# Patient Record
Sex: FEMALE | Race: WHITE | NOT HISPANIC OR LATINO | URBAN - METROPOLITAN AREA
[De-identification: names, ages, dates, MRNs, and addresses within clinical notes are randomized per-mention and may not be internally consistent; named-entity substitution may affect disease eponyms.]

---

## 2018-10-24 ENCOUNTER — INPATIENT (INPATIENT)
Facility: HOSPITAL | Age: 48
LOS: 6 days | Discharge: HOME | DRG: 539 | End: 2018-10-31
Attending: INTERNAL MEDICINE | Admitting: HOSPITALIST
Payer: COMMERCIAL

## 2018-10-24 VITALS
HEART RATE: 112 BPM | RESPIRATION RATE: 19 BRPM | DIASTOLIC BLOOD PRESSURE: 90 MMHG | SYSTOLIC BLOOD PRESSURE: 148 MMHG | OXYGEN SATURATION: 98 % | TEMPERATURE: 99 F | WEIGHT: 132.06 LBS | HEIGHT: 63 IN

## 2018-10-24 DIAGNOSIS — Z30.2 ENCOUNTER FOR STERILIZATION: Chronic | ICD-10-CM

## 2018-10-24 DIAGNOSIS — M54.9 DORSALGIA, UNSPECIFIED: ICD-10-CM

## 2018-10-24 LAB
ALBUMIN SERPL ELPH-MCNC: 4 G/DL — SIGNIFICANT CHANGE UP (ref 3.3–5)
ALP SERPL-CCNC: 79 U/L — SIGNIFICANT CHANGE UP (ref 40–120)
ALT FLD-CCNC: 18 U/L — SIGNIFICANT CHANGE UP (ref 10–45)
ANION GAP SERPL CALC-SCNC: 14 MMOL/L — SIGNIFICANT CHANGE UP (ref 5–17)
APPEARANCE UR: CLEAR — SIGNIFICANT CHANGE UP
APTT BLD: 47.3 SEC — HIGH (ref 27.5–37.4)
AST SERPL-CCNC: 18 U/L — SIGNIFICANT CHANGE UP (ref 10–40)
BACTERIA # UR AUTO: NEGATIVE — SIGNIFICANT CHANGE UP
BASOPHILS # BLD AUTO: 0 K/UL — SIGNIFICANT CHANGE UP (ref 0–0.2)
BASOPHILS NFR BLD AUTO: 0.1 % — SIGNIFICANT CHANGE UP (ref 0–2)
BILIRUB SERPL-MCNC: 0.3 MG/DL — SIGNIFICANT CHANGE UP (ref 0.2–1.2)
BILIRUB UR-MCNC: NEGATIVE — SIGNIFICANT CHANGE UP
BUN SERPL-MCNC: 10 MG/DL — SIGNIFICANT CHANGE UP (ref 7–23)
CALCIUM SERPL-MCNC: 9.8 MG/DL — SIGNIFICANT CHANGE UP (ref 8.4–10.5)
CHLORIDE SERPL-SCNC: 101 MMOL/L — SIGNIFICANT CHANGE UP (ref 96–108)
CO2 SERPL-SCNC: 23 MMOL/L — SIGNIFICANT CHANGE UP (ref 22–31)
COLOR SPEC: SIGNIFICANT CHANGE UP
CREAT SERPL-MCNC: 0.52 MG/DL — SIGNIFICANT CHANGE UP (ref 0.5–1.3)
DIFF PNL FLD: ABNORMAL
EOSINOPHIL # BLD AUTO: 0 K/UL — SIGNIFICANT CHANGE UP (ref 0–0.5)
EOSINOPHIL NFR BLD AUTO: 0.4 % — SIGNIFICANT CHANGE UP (ref 0–6)
EPI CELLS # UR: 1 /HPF — SIGNIFICANT CHANGE UP
ERYTHROCYTE [SEDIMENTATION RATE] IN BLOOD: 58 MM/HR — HIGH (ref 0–15)
GAS PNL BLDV: SIGNIFICANT CHANGE UP
GLUCOSE SERPL-MCNC: 101 MG/DL — HIGH (ref 70–99)
GLUCOSE UR QL: NEGATIVE — SIGNIFICANT CHANGE UP
HCG SERPL-ACNC: <2 MIU/ML — SIGNIFICANT CHANGE UP
HCT VFR BLD CALC: 39 % — SIGNIFICANT CHANGE UP (ref 34.5–45)
HGB BLD-MCNC: 12.8 G/DL — SIGNIFICANT CHANGE UP (ref 11.5–15.5)
HYALINE CASTS # UR AUTO: 0 /LPF — SIGNIFICANT CHANGE UP (ref 0–2)
INR BLD: 1.3 RATIO — HIGH (ref 0.88–1.16)
KETONES UR-MCNC: ABNORMAL
LEUKOCYTE ESTERASE UR-ACNC: NEGATIVE — SIGNIFICANT CHANGE UP
LYMPHOCYTES # BLD AUTO: 1.6 K/UL — SIGNIFICANT CHANGE UP (ref 1–3.3)
LYMPHOCYTES # BLD AUTO: 19.1 % — SIGNIFICANT CHANGE UP (ref 13–44)
MCHC RBC-ENTMCNC: 28.5 PG — SIGNIFICANT CHANGE UP (ref 27–34)
MCHC RBC-ENTMCNC: 32.7 GM/DL — SIGNIFICANT CHANGE UP (ref 32–36)
MCV RBC AUTO: 87 FL — SIGNIFICANT CHANGE UP (ref 80–100)
MONOCYTES # BLD AUTO: 0.8 K/UL — SIGNIFICANT CHANGE UP (ref 0–0.9)
MONOCYTES NFR BLD AUTO: 9 % — SIGNIFICANT CHANGE UP (ref 2–14)
NEUTROPHILS # BLD AUTO: 6.2 K/UL — SIGNIFICANT CHANGE UP (ref 1.8–7.4)
NEUTROPHILS NFR BLD AUTO: 71.3 % — SIGNIFICANT CHANGE UP (ref 43–77)
NITRITE UR-MCNC: NEGATIVE — SIGNIFICANT CHANGE UP
PH UR: 6 — SIGNIFICANT CHANGE UP (ref 5–8)
PLATELET # BLD AUTO: 338 K/UL — SIGNIFICANT CHANGE UP (ref 150–400)
POTASSIUM SERPL-MCNC: 4.2 MMOL/L — SIGNIFICANT CHANGE UP (ref 3.5–5.3)
POTASSIUM SERPL-SCNC: 4.2 MMOL/L — SIGNIFICANT CHANGE UP (ref 3.5–5.3)
PROT SERPL-MCNC: 8.2 G/DL — SIGNIFICANT CHANGE UP (ref 6–8.3)
PROT UR-MCNC: NEGATIVE — SIGNIFICANT CHANGE UP
PROTHROM AB SERPL-ACNC: 14.2 SEC — HIGH (ref 9.8–12.7)
RAPID RVP RESULT: SIGNIFICANT CHANGE UP
RBC # BLD: 4.48 M/UL — SIGNIFICANT CHANGE UP (ref 3.8–5.2)
RBC # FLD: 13.3 % — SIGNIFICANT CHANGE UP (ref 10.3–14.5)
RBC CASTS # UR COMP ASSIST: 2 /HPF — SIGNIFICANT CHANGE UP (ref 0–4)
SODIUM SERPL-SCNC: 138 MMOL/L — SIGNIFICANT CHANGE UP (ref 135–145)
SP GR SPEC: 1.01 — SIGNIFICANT CHANGE UP (ref 1.01–1.02)
UROBILINOGEN FLD QL: NEGATIVE — SIGNIFICANT CHANGE UP
WBC # BLD: 8.6 K/UL — SIGNIFICANT CHANGE UP (ref 3.8–10.5)
WBC # FLD AUTO: 8.6 K/UL — SIGNIFICANT CHANGE UP (ref 3.8–10.5)
WBC UR QL: 1 /HPF — SIGNIFICANT CHANGE UP (ref 0–5)

## 2018-10-24 PROCEDURE — 99285 EMERGENCY DEPT VISIT HI MDM: CPT | Mod: 25

## 2018-10-24 PROCEDURE — 93010 ELECTROCARDIOGRAM REPORT: CPT | Mod: NC

## 2018-10-24 PROCEDURE — 71046 X-RAY EXAM CHEST 2 VIEWS: CPT | Mod: 26

## 2018-10-24 RX ORDER — PIPERACILLIN AND TAZOBACTAM 4; .5 G/20ML; G/20ML
3.38 INJECTION, POWDER, LYOPHILIZED, FOR SOLUTION INTRAVENOUS ONCE
Qty: 0 | Refills: 0 | Status: COMPLETED | OUTPATIENT
Start: 2018-10-24 | End: 2018-10-24

## 2018-10-24 RX ORDER — VANCOMYCIN HCL 1 G
1000 VIAL (EA) INTRAVENOUS ONCE
Qty: 0 | Refills: 0 | Status: COMPLETED | OUTPATIENT
Start: 2018-10-24 | End: 2018-10-24

## 2018-10-24 RX ADMIN — Medication 250 MILLIGRAM(S): at 23:45

## 2018-10-24 RX ADMIN — PIPERACILLIN AND TAZOBACTAM 200 GRAM(S): 4; .5 INJECTION, POWDER, LYOPHILIZED, FOR SOLUTION INTRAVENOUS at 22:09

## 2018-10-24 RX ADMIN — PIPERACILLIN AND TAZOBACTAM 3.38 GRAM(S): 4; .5 INJECTION, POWDER, LYOPHILIZED, FOR SOLUTION INTRAVENOUS at 23:54

## 2018-10-24 NOTE — CONSULT NOTE ADULT - SUBJECTIVE AND OBJECTIVE BOX
p ([id number])     HPI: 47 y/o female PMHX HTN, Ulcerative Colitis was directed by neurosurgeon Dr Usman Craig for further w/u as pt was found to have possible infected disc space L1-L2 vs multiple myeloma seen on outpatient PET CT scan. Patient was having lower back spasms for one month with radiation of pain to bilateral hips described to be an ache. Pain improved with lying down and worse with prolonged walking/standing in one position too long. Pt seen by pain management Dr. Jcarlos Robert who performed trigger point injections with relief of symptoms and recommended MR back which showed disc extrusion L1-L2. Results appeared to be inconclusive per patient thus prompted pain management to get PET scan. Patient stated she has had 15 pound intentional weight loss as pt is on weight watchers, has chills with subjective fevers, and post nasal drip cough. Patient denied fever chills, SOB, abdominal pain, N/V/D, headache, neurosensory defecits, night sweats, rash    PAST MEDICAL HISTORY   HTN (hypertension)  Depression  Ulcerative colitis    PAST SURGICAL HISTORY   Encounter for Essure implantation        MEDICATIONS:  Antibiotics:  piperacillin/tazobactam IVPB. 3.375 Gram(s) IV Intermittent once  vancomycin  IVPB 1000 milliGRAM(s) IV Intermittent once    Neuro:    Anticoagulation:    Other:      SOCIAL HISTORY:   Occupation:   Marital Status:     FAMILY HISTORY:      REVIEW OF SYSTEMS:  negative but for hpi  General:  Eyes:  ENT:  Cardiac:  Respiratory:  GI:  Musculoskeletal:   Skin:  Neurologic:   Psychiatric:     PHYSICAL EXAMINATION:   T(C): 37.2 (10-24-18 @ 18:59), Max: 37.2 (10-24-18 @ 18:06)  HR: 102 (10-24-18 @ 18:59) (102 - 112)  BP: 124/84 (10-24-18 @ 18:59) (124/84 - 148/90)  RR: 18 (10-24-18 @ 18:59) (18 - 19)  SpO2: 100% (10-24-18 @ 18:59) (98% - 100%)  Wt(kg): --Height (cm): 160.02 (10-24 @ 18:06)  Weight (kg): 59.9 (10-24 @ 18:06)    General Examination:     Neurologic Examination:           PHYSICAL EXAM:    Constitutional: No Acute Distress     Neurological: AOx3, Following Commands    Motor exam:          Upper extremity                         Delt     Bicep     Tricep    HG                                                 R         5/5 5/5 5/5 5/5                                               L          5/5 5/5 5/5 5/5          Lower extremity                        HF         KF        KE       DF         PF                                                  R        5/5 5/5 5/5 5/5 5/5                                               L         5/5 5/5 5/5 5/5 5/5                                                 Sensation: [x] intact to light touch  [] decreased:     No clonus, no hoffmans      LABS:                RADIOLOGY & ADDITIONAL STUDIES:

## 2018-10-24 NOTE — ED ADULT NURSE NOTE - CHPI ED NUR SYMPTOMS NEG
no anorexia/no numbness/no tingling/no bowel dysfunction/no difficulty bearing weight/no motor function loss/no bladder dysfunction

## 2018-10-24 NOTE — ED PROVIDER NOTE - ATTENDING CONTRIBUTION TO CARE
49 y/o F with h/o UC (on remicade), HTN, depression and recent chronic/subacute back pain (1month +), who was sent to ED for evaluation after outpatient PET scan showed evidence of possible myelitis vs multiple myeloma.  Had MRI previously with concerning findings, had pet ct performed and then went to neurosurgeon today, who evaluated patient and imaging and directed her to ED for further w/u.  Pain in low back is described as sharp/spasms, worse with walking or standing in one position too long.  No weakness in extremity reported.  No numbness or loss of sensation.  No loss of bowel or bladder control reported.    On Physical Exam:  General: well appearing, afebrile, in NAD, speaking clearly in full sentences and without difficulty; cooperative with exam  HEENT: PERRL, MMM  Neck: no neck tenderness, no nuchal rigidity  Cardiac: normal s1, s2; RRR; no MGR  Lungs: CTABL  Abdomen: soft nontender/nondistended  Back: mild tenderness to palpation along upper mid lumbar region.  No step/off, no overlying erythema/warmth/crepitance.  : no bladder tenderness or distension  Skin: intact, no rash  Extremities: no peripheral edema, no gross deformities  Neuro: no gross neurologic deficits.  No LE weakness: 5/5 str in hips/knees/ankles b/l.  No saddle anesthesia or other areas of sensory loss elicited.  Patellar and ankle reflexes present and equal bilaterally.    AP: Imaging and complaints concerning for indolent discitis vs underlying malignancy. Have consulted spine and will likely require admission for further evaluation and management.

## 2018-10-24 NOTE — ED ADULT NURSE NOTE - OBJECTIVE STATEMENT
48 year old female presents ambulatory with a steady gait to the ED  with a chief compliant of lower back pain x 1 month. Per PCP Rose Marie patient got a PET scan this morning for the lower back pain which showed either " a massive infection or multiple myeloma." Patient was sent here for work up. Back pain is a 2/10 midline lumbar spine radiating down to bilateral hips. Patient states she feels like "weight is sitting on my hips." Patient confirms she can no longer run or walk fast x 1 month.  No sensory or motor deficits. No numbness or tingling in upper and lower extremities. No  or GI symptoms. Patient confirms feeling chills and overheated but has not taken her temperature.  Patient denies nausea, vomiting, diarrhea, chest pain, or shortness of breath. PMH ulcerative colitis. Patient in no acute distress. 48 year old female presents ambulatory with a steady gait to the ED  with a chief compliant of lower back pain x 1 month. Per PCP Rose Marie patient got a PET scan this morning for the lower back pain which showed either " a massive infection or multiple myeloma." Patient was sent here for work up. Back pain is a 2/10 midline lumbar spine radiating down to bilateral hips. Patient states she feels like "weight is sitting on my hips." Patient confirms she can no longer run or walk fast x 1 month.  No sensory or motor deficits. No numbness or tingling in upper and lower extremities. No  or GI symptoms. Patient confirms feeling chills and overheated but has not taken her temperature.  Patient denies nausea, vomiting, diarrhea, chest pain, or shortness of breath. PMH HTN and ulcerative colitis. Patient in no acute distress and declines pain medication at this time. Will continue to monitor.

## 2018-10-24 NOTE — ED ADULT TRIAGE NOTE - CHIEF COMPLAINT QUOTE
c/o back pain rad to juwan hips x 1 month, had PET today: "massive infection vs multiple myeloma tumor on lumbar spine", taking tylenol at home for pain, "hard time controlling body temperature..freezing...very hot", ambulatory

## 2018-10-24 NOTE — ED PROVIDER NOTE - OBJECTIVE STATEMENT
49 y/o female PMHX HTN, Ulcerative Colitis was directed by neurosurgeon Dr. Dr Usman Craig for further w/u as pt was found to have possible infected disc space L1-L2 vs multiple myeloma seen on outpatient PET CT scan. Patient was having lower back spasms for one month with radiation of pain to bilateral hips described to be an ache. Pt seen by pain management Dr. Jcarlos Robert who performed trigger point injections with relief of symptoms and recommended MR back which showed disc extrusion L1-L2. results appeared to be inconclusive per patient 47 y/o female PMHX HTN, Ulcerative Colitis was directed by neurosurgeon Dr Usman Craig for further w/u as pt was found to have possible infected disc space L1-L2 vs multiple myeloma seen on outpatient PET CT scan. Patient was having lower back spasms for one month with radiation of pain to bilateral hips described to be an ache. Pain improved with lying down and worse with prolonged walking/standing in one position too long. Pt seen by pain management Dr. Jcarlos Robert who performed trigger point injections with relief of symptoms and recommended MR back which showed disc extrusion L1-L2. Results appeared to be inconclusive per patient thus prompted pain management to get PET scan. Patient stated she has had 15 pound intentional weight loss as pt is on weight watchers, has chills with subjective fevers, and post nasal drip cough. Patient denied fever chills, SOB, abdominal pain, N/V/D, headache, neurosensory defecits, night sweats, rash 47 y/o female PMHX HTN, Ulcerative Colitis on Remicaide was directed by neurosurgeon Dr Usman Craig for further w/u as pt was found to have possible infected disc space L1-L2 vs multiple myeloma seen on outpatient PET CT scan. Patient was having lower back spasms for one month with radiation of pain to bilateral hips described to be an ache. Pain improved with lying down and worse with prolonged walking/standing in one position too long. Pt seen by pain management Dr. Jcarlos Robert who performed trigger point injections with relief of symptoms and recommended MR back which showed disc extrusion L1-L2. Results appeared to be inconclusive per patient thus prompted pain management to get PET scan. Patient stated she has had 15 pound intentional weight loss as pt is on weight watchers, has chills with subjective fevers, and post nasal drip cough. Patient denied fever chills, SOB, abdominal pain, N/V/D, headache, neurosensory defecits, night sweats, rash 47 y/o female PMHX HTN, Ulcerative Colitis on Remicaide was directed by neurosurgeon Dr Usman Craig for further w/u as pt was found to have possible infected disc space L1-L2 vs multiple myeloma seen on outpatient PET CT scan. Patient was having lower back spasms for one month with radiation of pain to bilateral hips described to be an ache. Pain improved with lying down and worse with prolonged walking/standing in one position too long. Pt seen by pain management Dr. Jcarlos Robert who performed trigger point injections with relief of symptoms and recommended MR back which showed disc extrusion L1-L2. Results appeared to be inconclusive per patient thus prompted pain management to get PET scan. Patient stated she has had 15 pound intentional weight loss as pt is on weight watchers, has chills with subjective fevers, and post nasal drip cough. Patient denied fever chills, SOB, abdominal pain, N/V/D, headache, neurosensory defecits, night sweats, rash    PMD: Dr. Srinivasan Trotter

## 2018-10-24 NOTE — ED PROVIDER NOTE - PROGRESS NOTE DETAILS
Eliazar Orellana: Paged neurosurgery for spine eval PAU Orellana: Gave CD to charge RN to upload at medical records. ED tech will run images to medical records PAU Orellana: Made medical records [id number] aware will be sending CDs and confirmed with radiology tech Tao extension [phone] who will upload them PAU Orellana: Discussed case with neurosurgery who believed this case likely infectious and related to osteomyelitis. As pt is immunosuppressed secondary to remicade will give vanc zosyn to empirically treat

## 2018-10-25 DIAGNOSIS — M46.20 OSTEOMYELITIS OF VERTEBRA, SITE UNSPECIFIED: ICD-10-CM

## 2018-10-25 DIAGNOSIS — I10 ESSENTIAL (PRIMARY) HYPERTENSION: ICD-10-CM

## 2018-10-25 DIAGNOSIS — Q76.49 OTHER CONGENITAL MALFORMATIONS OF SPINE, NOT ASSOCIATED WITH SCOLIOSIS: ICD-10-CM

## 2018-10-25 DIAGNOSIS — R70.0 ELEVATED ERYTHROCYTE SEDIMENTATION RATE: ICD-10-CM

## 2018-10-25 DIAGNOSIS — F32.9 MAJOR DEPRESSIVE DISORDER, SINGLE EPISODE, UNSPECIFIED: ICD-10-CM

## 2018-10-25 DIAGNOSIS — K68.12 PSOAS MUSCLE ABSCESS: ICD-10-CM

## 2018-10-25 DIAGNOSIS — Z29.9 ENCOUNTER FOR PROPHYLACTIC MEASURES, UNSPECIFIED: ICD-10-CM

## 2018-10-25 DIAGNOSIS — R79.82 ELEVATED C-REACTIVE PROTEIN (CRP): ICD-10-CM

## 2018-10-25 DIAGNOSIS — M54.9 DORSALGIA, UNSPECIFIED: ICD-10-CM

## 2018-10-25 DIAGNOSIS — K51.90 ULCERATIVE COLITIS, UNSPECIFIED, WITHOUT COMPLICATIONS: ICD-10-CM

## 2018-10-25 LAB
% ALBUMIN: 45.9 % — SIGNIFICANT CHANGE UP
% ALPHA 1: 6.4 % — SIGNIFICANT CHANGE UP
% ALPHA 2: 11.7 % — SIGNIFICANT CHANGE UP
% BETA: 13.6 % — SIGNIFICANT CHANGE UP
% GAMMA: 22.4 % — SIGNIFICANT CHANGE UP
ALBUMIN SERPL ELPH-MCNC: 3.7 G/DL — SIGNIFICANT CHANGE UP (ref 3.6–5.5)
ALBUMIN/GLOB SERPL ELPH: 0.8 RATIO — SIGNIFICANT CHANGE UP
ALPHA1 GLOB SERPL ELPH-MCNC: 0.5 G/DL — HIGH (ref 0.1–0.4)
ALPHA2 GLOB SERPL ELPH-MCNC: 0.9 G/DL — SIGNIFICANT CHANGE UP (ref 0.5–1)
ANION GAP SERPL CALC-SCNC: 12 MMOL/L — SIGNIFICANT CHANGE UP (ref 5–17)
B-GLOBULIN SERPL ELPH-MCNC: 1.1 G/DL — HIGH (ref 0.5–1)
B2 MICROGLOB SERPL-MCNC: 2.3 MG/L — HIGH (ref 0.8–2.2)
BASOPHILS # BLD AUTO: 0.01 K/UL — SIGNIFICANT CHANGE UP (ref 0–0.2)
BASOPHILS NFR BLD AUTO: 0.1 % — SIGNIFICANT CHANGE UP (ref 0–2)
BUN SERPL-MCNC: 9 MG/DL — SIGNIFICANT CHANGE UP (ref 7–23)
CALCIUM SERPL-MCNC: 9.4 MG/DL — SIGNIFICANT CHANGE UP (ref 8.4–10.5)
CHLORIDE SERPL-SCNC: 103 MMOL/L — SIGNIFICANT CHANGE UP (ref 96–108)
CO2 SERPL-SCNC: 23 MMOL/L — SIGNIFICANT CHANGE UP (ref 22–31)
CREAT SERPL-MCNC: 0.54 MG/DL — SIGNIFICANT CHANGE UP (ref 0.5–1.3)
CRP SERPL-MCNC: 5.45 MG/DL — HIGH (ref 0–0.4)
CULTURE RESULTS: NO GROWTH — SIGNIFICANT CHANGE UP
EOSINOPHIL # BLD AUTO: 0.06 K/UL — SIGNIFICANT CHANGE UP (ref 0–0.5)
EOSINOPHIL NFR BLD AUTO: 0.8 % — SIGNIFICANT CHANGE UP (ref 0–6)
GAMMA GLOBULIN: 1.8 G/DL — HIGH (ref 0.6–1.6)
GLUCOSE SERPL-MCNC: 90 MG/DL — SIGNIFICANT CHANGE UP (ref 70–99)
HCT VFR BLD CALC: 35.9 % — SIGNIFICANT CHANGE UP (ref 34.5–45)
HGB BLD-MCNC: 11.6 G/DL — SIGNIFICANT CHANGE UP (ref 11.5–15.5)
IMM GRANULOCYTES NFR BLD AUTO: 0.3 % — SIGNIFICANT CHANGE UP (ref 0–1.5)
INTERPRETATION SERPL IFE-IMP: SIGNIFICANT CHANGE UP
LDH SERPL L TO P-CCNC: 166 U/L — SIGNIFICANT CHANGE UP (ref 50–242)
LYMPHOCYTES # BLD AUTO: 2.02 K/UL — SIGNIFICANT CHANGE UP (ref 1–3.3)
LYMPHOCYTES # BLD AUTO: 27 % — SIGNIFICANT CHANGE UP (ref 13–44)
MCHC RBC-ENTMCNC: 27.4 PG — SIGNIFICANT CHANGE UP (ref 27–34)
MCHC RBC-ENTMCNC: 32.3 GM/DL — SIGNIFICANT CHANGE UP (ref 32–36)
MCV RBC AUTO: 84.9 FL — SIGNIFICANT CHANGE UP (ref 80–100)
MONOCYTES # BLD AUTO: 0.98 K/UL — HIGH (ref 0–0.9)
MONOCYTES NFR BLD AUTO: 13.1 % — SIGNIFICANT CHANGE UP (ref 2–14)
NEUTROPHILS # BLD AUTO: 4.4 K/UL — SIGNIFICANT CHANGE UP (ref 1.8–7.4)
NEUTROPHILS NFR BLD AUTO: 58.7 % — SIGNIFICANT CHANGE UP (ref 43–77)
PLATELET # BLD AUTO: 344 K/UL — SIGNIFICANT CHANGE UP (ref 150–400)
POTASSIUM SERPL-MCNC: 4.2 MMOL/L — SIGNIFICANT CHANGE UP (ref 3.5–5.3)
POTASSIUM SERPL-SCNC: 4.2 MMOL/L — SIGNIFICANT CHANGE UP (ref 3.5–5.3)
PROT PATTERN SERPL ELPH-IMP: SIGNIFICANT CHANGE UP
PROT SERPL-MCNC: 8.1 G/DL — SIGNIFICANT CHANGE UP (ref 6–8.3)
RBC # BLD: 4.23 M/UL — SIGNIFICANT CHANGE UP (ref 3.8–5.2)
RBC # FLD: 14.3 % — SIGNIFICANT CHANGE UP (ref 10.3–14.5)
SODIUM SERPL-SCNC: 138 MMOL/L — SIGNIFICANT CHANGE UP (ref 135–145)
SPECIMEN SOURCE: SIGNIFICANT CHANGE UP
WBC # BLD: 7.49 K/UL — SIGNIFICANT CHANGE UP (ref 3.8–10.5)
WBC # FLD AUTO: 7.49 K/UL — SIGNIFICANT CHANGE UP (ref 3.8–10.5)

## 2018-10-25 PROCEDURE — 99223 1ST HOSP IP/OBS HIGH 75: CPT

## 2018-10-25 PROCEDURE — 12345: CPT | Mod: NC

## 2018-10-25 PROCEDURE — 99254 IP/OBS CNSLTJ NEW/EST MOD 60: CPT

## 2018-10-25 PROCEDURE — 72149 MRI LUMBAR SPINE W/DYE: CPT | Mod: 26

## 2018-10-25 RX ORDER — INFLUENZA VIRUS VACCINE 15; 15; 15; 15 UG/.5ML; UG/.5ML; UG/.5ML; UG/.5ML
0.5 SUSPENSION INTRAMUSCULAR ONCE
Qty: 0 | Refills: 0 | Status: DISCONTINUED | OUTPATIENT
Start: 2018-10-25 | End: 2018-10-31

## 2018-10-25 RX ORDER — INFLIXIMAB-DYYB 120 MG/ML
0 INJECTION SUBCUTANEOUS
Qty: 0 | Refills: 0 | COMMUNITY

## 2018-10-25 RX ORDER — ENOXAPARIN SODIUM 100 MG/ML
40 INJECTION SUBCUTANEOUS EVERY 24 HOURS
Qty: 0 | Refills: 0 | Status: COMPLETED | OUTPATIENT
Start: 2018-10-25 | End: 2018-10-28

## 2018-10-25 RX ORDER — LOSARTAN POTASSIUM 100 MG/1
25 TABLET, FILM COATED ORAL DAILY
Qty: 0 | Refills: 0 | Status: DISCONTINUED | OUTPATIENT
Start: 2018-10-25 | End: 2018-10-25

## 2018-10-25 RX ORDER — LEVOMILNACIPRAN HYDROCHLORIDE 40 MG/1
1 CAPSULE, EXTENDED RELEASE ORAL
Qty: 0 | Refills: 0 | COMMUNITY

## 2018-10-25 RX ORDER — DOCOSANOL 100 MG/G
1 CREAM TOPICAL
Qty: 0 | Refills: 0 | Status: DISCONTINUED | OUTPATIENT
Start: 2018-10-25 | End: 2018-10-31

## 2018-10-25 RX ORDER — LOSARTAN POTASSIUM 100 MG/1
0 TABLET, FILM COATED ORAL
Qty: 0 | Refills: 0 | COMMUNITY

## 2018-10-25 RX ORDER — PIPERACILLIN AND TAZOBACTAM 4; .5 G/20ML; G/20ML
3.38 INJECTION, POWDER, LYOPHILIZED, FOR SOLUTION INTRAVENOUS EVERY 8 HOURS
Qty: 0 | Refills: 0 | Status: DISCONTINUED | OUTPATIENT
Start: 2018-10-25 | End: 2018-10-25

## 2018-10-25 RX ORDER — VANCOMYCIN HCL 1 G
1000 VIAL (EA) INTRAVENOUS EVERY 12 HOURS
Qty: 0 | Refills: 0 | Status: DISCONTINUED | OUTPATIENT
Start: 2018-10-25 | End: 2018-10-25

## 2018-10-25 RX ORDER — LOSARTAN POTASSIUM 100 MG/1
50 TABLET, FILM COATED ORAL DAILY
Qty: 0 | Refills: 0 | Status: DISCONTINUED | OUTPATIENT
Start: 2018-10-25 | End: 2018-10-31

## 2018-10-25 RX ORDER — LOSARTAN POTASSIUM 100 MG/1
50 TABLET, FILM COATED ORAL
Qty: 0 | Refills: 0 | COMMUNITY

## 2018-10-25 RX ORDER — ACYCLOVIR 50 MG/G
1 OINTMENT TOPICAL
Qty: 0 | Refills: 0 | Status: DISCONTINUED | OUTPATIENT
Start: 2018-10-25 | End: 2018-10-25

## 2018-10-25 RX ADMIN — LOSARTAN POTASSIUM 50 MILLIGRAM(S): 100 TABLET, FILM COATED ORAL at 09:58

## 2018-10-25 RX ADMIN — DOCOSANOL 1 APPLICATION(S): 100 CREAM TOPICAL at 12:39

## 2018-10-25 RX ADMIN — DOCOSANOL 1 APPLICATION(S): 100 CREAM TOPICAL at 21:59

## 2018-10-25 RX ADMIN — DOCOSANOL 1 APPLICATION(S): 100 CREAM TOPICAL at 16:21

## 2018-10-25 RX ADMIN — ENOXAPARIN SODIUM 40 MILLIGRAM(S): 100 INJECTION SUBCUTANEOUS at 10:02

## 2018-10-25 NOTE — H&P ADULT - PROBLEM SELECTOR PLAN 3
Well controlled on Losartan, though pt unclear on dose.  - Start on losartan 25mg  - Verify dose in AM On Fetzima (SNRI).  - Not on formulary. Pt will have to bring in her own.

## 2018-10-25 NOTE — PROGRESS NOTE ADULT - SUBJECTIVE AND OBJECTIVE BOX
Mika Hedrick M.D. Pager Number [phone]    Patient is a 48y old  Female who presents with a chief complaint of Osteomyelitis vs MM (25 Oct 2018 05:29)      SUBJECTIVE / OVERNIGHT EVENTS:  Pt seen and examined at bedside. No acute events overnight. Pt denies symptoms of cp, sob, palpitations, N/V, abd pain. Pt does endorse mild lower back discomfort. She denies fevers, however, does endorse feeling hot.     ROS:  All other review of systems negative    Allergies    No Known Allergies    Intolerances        MEDICATIONS  (STANDING):  docosanol 10% Cream 1 Application(s) Topical five times a day  enoxaparin Injectable 40 milliGRAM(s) SubCutaneous every 24 hours  influenza   Vaccine 0.5 milliLiter(s) IntraMuscular once  Levomilnacipran (Fetzima) 40 milliGRAM(s) 40 milliGRAM(s) Oral daily  losartan 50 milliGRAM(s) Oral daily    MEDICATIONS  (PRN):      Vital Signs Last 24 Hrs  T(C): 36.8 (25 Oct 2018 05:27), Max: 37.3 (24 Oct 2018 23:00)  T(F): 98.2 (25 Oct 2018 05:27), Max: 99.1 (24 Oct 2018 23:00)  HR: 79 (25 Oct 2018 09:56) (79 - 112)  BP: 108/75 (25 Oct 2018 09:56) (108/75 - 148/90)  BP(mean): --  RR: 16 (25 Oct 2018 05:27) (16 - 19)  SpO2: 98% (25 Oct 2018 05:27) (98% - 100%)  CAPILLARY BLOOD GLUCOSE        I&O's Summary    25 Oct 2018 07:01  -  25 Oct 2018 12:24  --------------------------------------------------------  IN: 120 mL / OUT: 200 mL / NET: -80 mL        PHYSICAL EXAM:  GENERAL: NAD, well-developed  HEAD:  Atraumatic, Normocephalic  EYES: EOMI, PERRLA, conjunctiva and sclera clear  NECK: Supple, No JVD  CHEST/LUNG: Clear to auscultation bilaterally; No wheeze  HEART: Regular rate and rhythm; No murmurs, rubs, or gallops  ABDOMEN: Soft, Nontender, Nondistended; Bowel sounds present  EXTREMITIES:  2+ Peripheral Pulses, No clubbing, cyanosis, or edema  NEUROLOGY: AAOx3, non-focal  PSYCH: calm  SKIN: No rashes or lesions    LABS:                        11.6   7.49  )-----------( 344      ( 25 Oct 2018 07:45 )             35.9     10-25    138  |  103  |  9   ----------------------------<  90  4.2   |  23  |  0.54    Ca    9.4      25 Oct 2018 05:47    TPro  8.2  /  Alb  4.0  /  TBili  0.3  /  DBili  x   /  AST  18  /  ALT  18  /  AlkPhos  79  10-    PT/INR - ( 24 Oct 2018 21:01 )   PT: 14.2 sec;   INR: 1.30 ratio         PTT - ( 24 Oct 2018 21:01 )  PTT:47.3 sec      Urinalysis Basic - ( 24 Oct 2018 21:10 )    Color: Light Yellow / Appearance: Clear / S.010 / pH: x  Gluc: x / Ketone: Small  / Bili: Negative / Urobili: Negative   Blood: x / Protein: Negative / Nitrite: Negative   Leuk Esterase: Negative / RBC: 2 /hpf / WBC 1 /hpf   Sq Epi: x / Non Sq Epi: 1 /hpf / Bacteria: Negative        RADIOLOGY & ADDITIONAL TESTS:    Imaging Personally Reviewed:    Consultant(s) Notes Reviewed:      Care Discussed with Consultants/Other Providers:    Case Discussed with Family:    Goals of Care:

## 2018-10-25 NOTE — PROGRESS NOTE ADULT - SUBJECTIVE AND OBJECTIVE BOX
Patient seen and examined at bedside.    T(C): 36.8 (10-25-18 @ 05:27), Max: 37.3 (10-24-18 @ 23:00)  HR: 79 (10-25-18 @ 05:27) (79 - 112)  BP: 114/75 (10-25-18 @ 05:27) (111/75 - 148/90)  RR: 16 (10-25-18 @ 05:27) (16 - 19)  SpO2: 98% (10-25-18 @ 05:27) (98% - 100%)  Wt(kg): --    Exam:    Constitutional: No Acute Distress     Neurological: AOx3, Following Commands    Motor exam:          Upper extremity                         Delt     Bicep     Tricep    HG                                                 R         5/5        5/5        5/5       5/5                                               L          5/5        5/5        5/5       5/5          Lower extremity                        HF         KF        KE       DF         PF                                                  R        5/5        5/5        5/5       5/5         5/5                                               L         5/5        5/5       5/5       5/5          5/5                                                 Sensation: [x] intact to light touch  [] decreased:

## 2018-10-25 NOTE — H&P ADULT - NSHPPHYSICALEXAM_GEN_ALL_CORE
Constitutional: Middle aged woman seen lying in bed in NAD  Eyes: PERRL  ENMT: Clear oropharynx  Neck: Supple  Respiratory: CTAB, no rhonchi, rales or wheezes  Cardiovascular: RRR, no m/g/r  Gastrointestinal: +BS, soft, NT/ND  Neurological: AAOx3, 5/5 strength grossly in b/l UE and LE  Psychiatric: Appropriate affect and mood  Extremities: No LE edema Vital Signs Last 24 Hrs  T(C): 37.3 (24 Oct 2018 23:00), Max: 37.3 (24 Oct 2018 23:00)  T(F): 99.1 (24 Oct 2018 23:00), Max: 99.1 (24 Oct 2018 23:00)  HR: 86 (24 Oct 2018 23:00) (86 - 112)  BP: 111/75 (24 Oct 2018 23:00) (111/75 - 148/90)  RR: 16 (24 Oct 2018 23:00) (16 - 19)  SpO2: 100% (24 Oct 2018 23:00) (98% - 100%)    Constitutional: Middle aged woman seen lying in bed in NAD  Eyes: PERRL  ENMT: Clear oropharynx  Neck: Supple  Respiratory: CTAB, no rhonchi, rales or wheezes  Cardiovascular: RRR, no m/g/r  Gastrointestinal: +BS, soft, NT/ND  Neurological: AAOx3, 5/5 strength grossly in b/l UE and LE  Psychiatric: Appropriate affect and mood  Extremities: No LE edema

## 2018-10-25 NOTE — H&P ADULT - ASSESSMENT
47 yo F with PMH HTN, UC (on Remicaide), depression sent in from neurosurgeon's office (Dr. Usman Craig) for further evaluation of PET scan findings concerning for osteomyelitis vs. MM. 49 yo F with PMH HTN, UC (on Remicaide), depression sent in from neurosurgeon's office (Dr. Usman Craig) for further evaluation of spinal lesion on PET scan, findings concerning for osteomyelitis vs. MM.

## 2018-10-25 NOTE — CONSULT NOTE ADULT - PROBLEM SELECTOR RECOMMENDATION 9
-possibly related to recent injections   -staphylococcus or streptococcus most likely  -may need neuroradiology for biopsy if blood cx negative  -hold on abx  -not septic  -check blood cx x 2 to r/o hematogenous spread  -neurosurgery input noted  -otherwise nonlocalizing for an alternate infection at this time

## 2018-10-25 NOTE — CONSULT NOTE ADULT - ATTENDING COMMENTS
Pt seen and examined today. Pt has T-L spine tenderness.    MRI L-spine +/- Gd obtained today reviewed and compared with prior MRI L-spine with Dr. Yisel Roberto. There has been progression of endplate changes at the L1-2 disc space. There is an enhancing paraspinal mass at this level.    CRP 5.45, ESR 58. BCx pending. Pt received single doses of Vanco and Piperacillin in ER, now not on Abx.    Recommend CT guided biopsy of L1-2 paraspinal enhancing mass with Anesthesia on 10/29/18. Hold off on Abx at present.
Steven Campos  Attending Physician   Division of Infectious Disease  Pager #[phone]  After 5pm/weekend or no response, call #[phone]

## 2018-10-25 NOTE — H&P ADULT - PROBLEM SELECTOR PLAN 1
Pt with back pain and PET scan findings concerning for osteomyelitis of L1-L2 vs. MM. Neurosurgery indicating no acute intervention overnight. ESR elevated but pt afebrile without white count on presentation. Pt endorsing mild anemia outpatient but Hgb wnl on presentation.  - Broad spectrum abx: vanc and zosyn  - F/u BCxs  - F/u neurosurgery recs  - Send SPEP, UPEP Pt with back pain and PET scan findings concerning for osteomyelitis of L1-L2 vs. MM. Neurosurgery indicating no acute intervention overnight. ESR elevated but pt afebrile without white count on presentation. Pt endorsing mild anemia outpatient but Hgb wnl on presentation.  - Will hold off on continuing abx until neurosurgery gives final recommendations as pt appears non-toxic at this time, however, low threshold to put her on broad spectrum.  - F/u BCxs  - F/u neurosurgery recs  - Send SPEP, UPEP, immunofixation, light chains --spinal lesion concerning for osteomyelitis vs malignancy such as multiple myeloma. Infectious process seems more likely as she has no other signs or symptoms of MM.  Pt with back pain and PET scan findings concerning for osteomyelitis of L1-L2 vs. MM. Neurosurgery indicating no acute intervention overnight. ESR elevated but pt afebrile without white count on presentation. Pt endorsing mild anemia outpatient but Hgb wnl on presentation.  - Discussed case with neurosurgery: hold off on additional abx in case of possible need for biopsy to guide treatment. Neurosurgery will review radiography early this morning once uploaded. Patient already received one dose each of vancomycin and zosyn. Currently non-toxic appearing, afebrile, no leukocytosis; however, low threshold to restart antibiotics if her condition changes at all.  - F/u BCxs  - F/u neurosurgery recs  - Send SPEP, UPEP, immunofixation, light chains

## 2018-10-25 NOTE — H&P ADULT - PROBLEM SELECTOR PLAN 4
DVT ppx: lovenox subq Well controlled on Losartan, though pt unclear on dose.  - Start on losartan 25mg  - Verify dose in AM Well controlled on Losartan  - C/w losartan 50mg

## 2018-10-25 NOTE — H&P ADULT - HISTORY OF PRESENT ILLNESS
49 yo F with PMH HTN, UC (on Remicaide), depression sent in from neurosurgeon's office (Dr. Usman Craig) for further evaluation of PET scan findings concerning for osteomyelitis vs. MM. Pt endorsed that in July, she fell and strained her back and had pain as a result. The pain improved but then, about 1 month ago, she strained it again trying to open a window and her pain returned. She endorsed muscle spasms that felt better when walking, worse when sitting still. She was sent to a pain management doctor who gave her trigger point injections which resolved the spasms. Since then, she has only had a pulling feeling in her mid/lower spine and hips that is uncomfortable but not painful. She says she is unable to bend over because of stiffness but that the pain is pretty much gone. She was sent for an MRI on 10/11 for further evaluation but the MRI was inconclusive. She then went for a PET scan this morning which showed hypermetabolism at L1, L2, concerning for infection vs. multiple myeloma. She was then referred to a neurosurgeon who recommended she present to the ED. Of note, she has been experiencing chills and hot flashes over the past 2 weeks but has not checked her temperature during this time.     ED:  VS - Tm 98.9, , /90, RR 19, SpO2 98% on RA  Pt given Zosyn and vanc x1 and neurosurgery consulted. 49 yo F with PMH HTN, UC (on Remicade), depression sent in from neurosurgeon's office (Dr. Usman Craig) for further evaluation of PET scan findings concerning for osteomyelitis vs. MM. Pt endorsed that in July, she fell and strained her back and had pain as a result. The pain improved but then, about 1 month ago, she strained it again trying to open a window and her pain returned. She endorsed muscle spasms that felt better when walking, worse when sitting still. She was sent to a pain management doctor who gave her trigger point injections which resolved the spasms. Since then, she has only had a pulling feeling in her mid/lower spine and hips that is uncomfortable but not painful. She says she is unable to bend over because of stiffness but that the pain is pretty much gone. She was sent for an MRI on 10/11 for further evaluation but the MRI was inconclusive. She then went for a PET scan this morning which showed hypermetabolism at L1, L2, concerning for infection vs. multiple myeloma. She was then referred to a neurosurgeon who recommended she present to the ED. Of note, she has been experiencing chills and hot flashes over the past 2 weeks but has not checked her temperature during this time.     ED:  VS - Tm 98.9, , /90, RR 19, SpO2 98% on RA  Pt given Zosyn and vanc x1 and neurosurgery consulted.

## 2018-10-25 NOTE — CONSULT NOTE ADULT - SUBJECTIVE AND OBJECTIVE BOX
ANANYA TOVAR 48y Female  MRN-[id number]    Patient is a 48y old  Female who presents with a chief complaint of Osteomyelitis vs MM (25 Oct 2018 12:24)      HPI:  49 yo F with PMH HTN, UC (on Remicade), depression sent in from neurosurgeon's office (Dr. Usman Craig) for further evaluation of PET scan findings concerning for osteomyelitis vs. MM. Pt endorsed that in July, she fell and strained her back and had pain as a result. The pain improved but then, about 1 month ago, she strained it again trying to open a window and her pain returned. She endorsed muscle spasms that felt better when walking, worse when sitting still. She was sent to a pain management doctor who gave her trigger point injections which resolved the spasms. Since then, she has only had a pulling feeling in her mid/lower spine and hips that is uncomfortable but not painful. She says she is unable to bend over because of stiffness but that the pain is pretty much gone. She was sent for an MRI on 10/11 for further evaluation but the MRI was inconclusive. She then went for a PET scan this morning which showed hypermetabolism at L1, L2, concerning for infection vs. multiple myeloma. She was then referred to a neurosurgeon who recommended she present to the ED. Of note, she has been experiencing chills and hot flashes over the past 2 weeks but has not checked her temperature during this time.     Pt on Remicade for UC    ED:  VS - Tm 98.9, , /90, RR 19, SpO2 98% on RA  Pt given Zosyn and vanc x1 and neurosurgery consulted. (25 Oct 2018 01:11)      PAST MEDICAL & SURGICAL HISTORY:  HTN (hypertension)  Depression  Ulcerative colitis  Encounter for Essure implantation      Allergies    No Known Allergies    Intolerances        ANTIMICROBIALS:  none    MEDICATIONS  (STANDING):  docosanol 10% Cream 1 Application(s) Topical five times a day  enoxaparin Injectable 40 milliGRAM(s) SubCutaneous every 24 hours  influenza   Vaccine 0.5 milliLiter(s) IntraMuscular once  Levomilnacipran (Fetzima) 40 milliGRAM(s) 40 milliGRAM(s) Oral daily  losartan 50 milliGRAM(s) Oral daily      Social History  Smoking: no  Etoh: no  Drug use: no  Born in [place]    Works at [employer]      FAMILY HISTORY:  Family history of COPD (chronic obstructive pulmonary disease) (Father)      Vital Signs Last 24 Hrs  T(C): 36.7 (25 Oct 2018 12:24), Max: 37.3 (24 Oct 2018 23:00)  T(F): 98.1 (25 Oct 2018 12:24), Max: 99.1 (24 Oct 2018 23:00)  HR: 88 (25 Oct 2018 12:24) (79 - 112)  BP: 109/74 (25 Oct 2018 12:24) (108/75 - 148/90)  BP(mean): --  RR: 18 (25 Oct 2018 12:24) (16 - 19)  SpO2: 100% (25 Oct 2018 12:24) (98% - 100%)    CBC Full  -  ( 25 Oct 2018 07:45 )  WBC Count : 7.49 K/uL  Hemoglobin : 11.6 g/dL  Hematocrit : 35.9 %  Platelet Count - Automated : 344 K/uL  Mean Cell Volume : 84.9 fl  Mean Cell Hemoglobin : 27.4 pg  Mean Cell Hemoglobin Concentration : 32.3 gm/dL  Auto Neutrophil # : 4.40 K/uL  Auto Lymphocyte # : 2.02 K/uL  Auto Monocyte # : 0.98 K/uL  Auto Eosinophil # : 0.06 K/uL  Auto Basophil # : 0.01 K/uL  Auto Neutrophil % : 58.7 %  Auto Lymphocyte % : 27.0 %  Auto Monocyte % : 13.1 %  Auto Eosinophil % : 0.8 %  Auto Basophil % : 0.1 %    10-25    138  |  103  |  9   ----------------------------<  90  4.2   |  23  |  0.54    Ca    9.4      25 Oct 2018 05:47    TPro  8.2  /  Alb  4.0  /  TBili  0.3  /  DBili  x   /  AST  18  /  ALT  18  /  AlkPhos  79  10-24    LIVER FUNCTIONS - ( 24 Oct 2018 21:01 )  Alb: 4.0 g/dL / Pro: 8.2 g/dL / ALK PHOS: 79 U/L / ALT: 18 U/L / AST: 18 U/L / GGT: x           Urinalysis Basic - ( 24 Oct 2018 21:10 )    Color: Light Yellow / Appearance: Clear / S.010 / pH: x  Gluc: x / Ketone: Small  / Bili: Negative / Urobili: Negative   Blood: x / Protein: Negative / Nitrite: Negative   Leuk Esterase: Negative / RBC: 2 /hpf / WBC 1 /hpf   Sq Epi: x / Non Sq Epi: 1 /hpf / Bacteria: Negative        MICROBIOLOGY:      Rapid RVP Result: NotDetec (10-24 @ 21:10)    RADIOLOGY  < from: MR Lumbar Spine w/ IV Cont (10.25.18 @ 09:13) >  Redemonstration of edema within the L1 and L2 vertebral bodies. New edema   within the L1-L2 intervertebral disc. New small erosions involving the   inferior endplate of L1 and superior endplate of L2. Abnormal enhancement   within the L1 and L2 vertebral bodies and endplates adjoining the L1-L2   disc space. Increased abnormal prevertebral and paravertebral soft tissue   at the L1-L2 level. Probable microabscesses in the psoas muscles.    Findings consistent with progressive discitis/osteomyelitis at L1-L2,   with prevertebral phlegmon and probable microabscesses in the psoas   muscles.    No spinal canal stenosis or neural foraminal narrowing.

## 2018-10-25 NOTE — H&P ADULT - NSHPREVIEWOFSYSTEMS_GEN_ALL_CORE
General: Denies dizziness, fatigue  Eyes: +blurry vision  ENMT: Denies rhinorrhea  Respiratory: Denies cough, SOB  Cardiovascular: Denies palpitations, CP  Gastrointestinal: Denies abd pain, N/V/D/C, hematochezia, melena  : Denies dysuria, increased freq  Musculoskeletal: +back pain; denies edema, joint pain  Endocrine: Denies increased thirst, increased frequency  Allergic/Immunologic: Denies rashes or hives General: Denies dizziness, fatigue  Eyes: +blurry vision  ENMT: Denies rhinorrhea  Respiratory: Denies cough, SOB  Cardiovascular: Denies palpitations, CP  Gastrointestinal: Denies abd pain, N/V/D/C, hematochezia, melena  : Denies dysuria, increased freq  Musculoskeletal: +back pain; denies edema, joint pain  Endocrine: Denies increased thirst, increased frequency  Allergic/Immunologic: Denies rashes or hives  Heme: no bleeding, petechiae  Psych: no anxiety, depression

## 2018-10-25 NOTE — H&P ADULT - NSHPLABSRESULTS_GEN_ALL_CORE
CBC Full  -  ( 24 Oct 2018 21:01 )  WBC Count : 8.6 K/uL  Hemoglobin : 12.8 g/dL  Hematocrit : 39.0 %  Platelet Count - Automated : 338 K/uL  Mean Cell Volume : 87.0 fl  Mean Cell Hemoglobin : 28.5 pg  Mean Cell Hemoglobin Concentration : 32.7 gm/dL  Auto Neutrophil # : 6.2 K/uL  Auto Lymphocyte # : 1.6 K/uL  Auto Monocyte # : 0.8 K/uL  Auto Eosinophil # : 0.0 K/uL  Auto Basophil # : 0.0 K/uL  Auto Neutrophil % : 71.3 %  Auto Lymphocyte % : 19.1 %  Auto Monocyte % : 9.0 %  Auto Eosinophil % : 0.4 %  Auto Basophil % : 0.1 %    10    138  |  101  |  10  ----------------------------<  101<H>  4.2   |  23  |  0.52    Ca    9.8      24 Oct 2018 21:01    TPro  8.2  /  Alb  4.0  /  TBili  0.3  /  DBili  x   /  AST  18  /  ALT  18  /  AlkPhos  79  10-    PT/INR - ( 24 Oct 2018 21:01 )   PT: 14.2 sec;   INR: 1.30 ratio    PTT - ( 24 Oct 2018 21:01 )  PTT:47.3 sec    Sedimentation Rate, Erythrocyte: 58 mm/hr (10.24.18 @ 21:01)    Blood Gas Venous - Lactate: 1.6 mmoL/L (10.24.18 @ 21:01)    Urinalysis Basic - ( 24 Oct 2018 21:10 )    Color: Light Yellow / Appearance: Clear / S.010 / pH: x  Gluc: x / Ketone: Small  / Bili: Negative / Urobili: Negative   Blood: x / Protein: Negative / Nitrite: Negative   Leuk Esterase: Negative / RBC: 2 /hpf / WBC 1 /hpf   Sq Epi: x / Non Sq Epi: 1 /hpf / Bacteria: Negative    < end of copied text >    Rapid RVP Result: NotDetec: The Film[employer] RVP Rapid uses polymerase chain reaction (PCR) and melt  curve analysis to screen for adenovirus; coronavirus HKU1, NL63, 229E,  OC43; human metapneumovirus (hMPV); human enterovirus/rhinovirus  (Entero/RV); influenza A; influenza A/H1;influenza A/H3; influenza  A/H1-2009; influenza B; parainfluenza viruses 1, 2, 3, 4; respiratory  syncytial virus; Bordetella pertussis; Mycoplasma pneumoniae; and  Chlamydophila pneumoniae. (10.24.18 @ 21:10)      < from: Xray Chest 2 Views PA/Lat (10.24.18 @ 20:30) >    PROCEDURE DATE:  10/24/2018      ******PRELIMINARY REPORT******    ******PRELIMINARY REPORT******            INTERPRETATION:  [facility] EKG, labs, and imaging personally reviewed and interpreted.     EKG: reviewed     CBC Full  -  ( 24 Oct 2018 21:01 )  WBC Count : 8.6 K/uL  Hemoglobin : 12.8 g/dL  Hematocrit : 39.0 %  Platelet Count - Automated : 338 K/uL  Mean Cell Volume : 87.0 fl  Mean Cell Hemoglobin : 28.5 pg  Mean Cell Hemoglobin Concentration : 32.7 gm/dL  Auto Neutrophil # : 6.2 K/uL  Auto Lymphocyte # : 1.6 K/uL  Auto Monocyte # : 0.8 K/uL  Auto Eosinophil # : 0.0 K/uL  Auto Basophil # : 0.0 K/uL  Auto Neutrophil % : 71.3 %  Auto Lymphocyte % : 19.1 %  Auto Monocyte % : 9.0 %  Auto Eosinophil % : 0.4 %  Auto Basophil % : 0.1 %    10    138  |  101  |  10  ----------------------------<  101<H>  4.2   |  23  |  0.52    Ca    9.8      24 Oct 2018 21:01    TPro  8.2  /  Alb  4.0  /  TBili  0.3  /  DBili  x   /  AST  18  /  ALT  18  /  AlkPhos  79  10-24    PT/INR - ( 24 Oct 2018 21:01 )   PT: 14.2 sec;   INR: 1.30 ratio    PTT - ( 24 Oct 2018 21:01 )  PTT:47.3 sec    Sedimentation Rate, Erythrocyte: 58 mm/hr (10.24.18 @ 21:01)    Blood Gas Venous - Lactate: 1.6 mmoL/L (10.24.18 @ 21:01)    Urinalysis Basic - ( 24 Oct 2018 21:10 )    Color: Light Yellow / Appearance: Clear / S.010 / pH: x  Gluc: x / Ketone: Small  / Bili: Negative / Urobili: Negative   Blood: x / Protein: Negative / Nitrite: Negative   Leuk Esterase: Negative / RBC: 2 /hpf / WBC 1 /hpf   Sq Epi: x / Non Sq Epi: 1 /hpf / Bacteria: Negative    < end of copied text >    Rapid RVP Result: NotDetec: The Film[employer] RVP Rapid uses polymerase chain reaction (PCR) and melt  curve analysis to screen for adenovirus; coronavirus HKU1, NL63, 229E,  OC43; human metapneumovirus (hMPV); human enterovirus/rhinovirus  (Entero/RV); influenza A; influenza A/H1;influenza A/H3; influenza  A/H1-2009; influenza B; parainfluenza viruses 1, 2, 3, 4; respiratory  syncytial virus; Bordetella pertussis; Mycoplasma pneumoniae; and  Chlamydophila pneumoniae. (10.24.18 @ 21:10)    < from: Xray Chest 2 Views PA/Lat (10.24.18 @ 20:30) >  PROCEDURE DATE:  10/24/2018    ******PRELIMINARY REPORT******        INTERPRETATION:  [facility]    Consult notes review: Yes, neurosurgery  Care discussed with other providers: Yes, neurosurgery

## 2018-10-25 NOTE — CONSULT NOTE ADULT - ASSESSMENT
48F here with back pain and abn finidngs on PET and MRI with L1/2 uptake concernin for OM vs MM  - no acute neurosurgical intervention  - F/u images once uploaded, unable to access as they were sent to be uploaded  - [facility] eval for OM vs MM  - Blood cx x2, esr, crp, CBC, BMP, SPEP, UPEP, M protein, free light chains  - pain control
47 yo F with PMH HTN, UC (on Remicade), depression sent in from neurosurgeon's office (Dr. Usman Craig) for further evaluation of PET scan findings concerning for spinal osteomyelitis, psoas abscess, back pain with elevated ESR, CRP

## 2018-10-25 NOTE — PROGRESS NOTE ADULT - PROBLEM SELECTOR PLAN 1
-spinal lesion concerning for osteomyelitis vs malignancy such as multiple myeloma. -Asymptomatic as well as hemodynamically stable. Physical exam unremarkable.  -Neurosurgery consult appreciated; No acute intervention.  -Follow up with MRI spine  -Follow up with BCxs  -Follow up with SPEP, UPEP, immunofixation, light chains

## 2018-10-26 DIAGNOSIS — Z79.2 LONG TERM (CURRENT) USE OF ANTIBIOTICS: ICD-10-CM

## 2018-10-26 DIAGNOSIS — Z51.81 ENCOUNTER FOR THERAPEUTIC DRUG LEVEL MONITORING: ICD-10-CM

## 2018-10-26 DIAGNOSIS — M86.18 OTHER ACUTE OSTEOMYELITIS, OTHER SITE: ICD-10-CM

## 2018-10-26 LAB
ANION GAP SERPL CALC-SCNC: 10 MMOL/L — SIGNIFICANT CHANGE UP (ref 5–17)
BUN SERPL-MCNC: 11 MG/DL — SIGNIFICANT CHANGE UP (ref 7–23)
CALCIUM SERPL-MCNC: 9.7 MG/DL — SIGNIFICANT CHANGE UP (ref 8.4–10.5)
CHLORIDE SERPL-SCNC: 101 MMOL/L — SIGNIFICANT CHANGE UP (ref 96–108)
CO2 SERPL-SCNC: 26 MMOL/L — SIGNIFICANT CHANGE UP (ref 22–31)
CREAT SERPL-MCNC: 0.56 MG/DL — SIGNIFICANT CHANGE UP (ref 0.5–1.3)
CREATININE, URINE RESULT: 83 MG/DL — SIGNIFICANT CHANGE UP
GLUCOSE SERPL-MCNC: 102 MG/DL — HIGH (ref 70–99)
HCT VFR BLD CALC: 37.9 % — SIGNIFICANT CHANGE UP (ref 34.5–45)
HGB BLD-MCNC: 12.6 G/DL — SIGNIFICANT CHANGE UP (ref 11.5–15.5)
KAPPA LC SER QL IFE: 2.3 MG/DL — HIGH (ref 0.33–1.94)
KAPPA/LAMBDA FREE LIGHT CHAIN RATIO, SERUM: 0.97 RATIO — SIGNIFICANT CHANGE UP (ref 0.26–1.65)
LAMBDA LC SER QL IFE: 2.38 MG/DL — SIGNIFICANT CHANGE UP (ref 0.57–2.63)
MCHC RBC-ENTMCNC: 29.3 PG — SIGNIFICANT CHANGE UP (ref 27–34)
MCHC RBC-ENTMCNC: 33.3 GM/DL — SIGNIFICANT CHANGE UP (ref 32–36)
MCV RBC AUTO: 87.8 FL — SIGNIFICANT CHANGE UP (ref 80–100)
PLATELET # BLD AUTO: 316 K/UL — SIGNIFICANT CHANGE UP (ref 150–400)
POTASSIUM SERPL-MCNC: 5 MMOL/L — SIGNIFICANT CHANGE UP (ref 3.5–5.3)
POTASSIUM SERPL-SCNC: 5 MMOL/L — SIGNIFICANT CHANGE UP (ref 3.5–5.3)
PROT ?TM UR-MCNC: 13 MG/DL — HIGH (ref 0–12)
PROT SERPL-MCNC: 7.7 G/DL — SIGNIFICANT CHANGE UP (ref 6–8.3)
RBC # BLD: 4.32 M/UL — SIGNIFICANT CHANGE UP (ref 3.8–5.2)
RBC # FLD: 12.7 % — SIGNIFICANT CHANGE UP (ref 10.3–14.5)
SODIUM SERPL-SCNC: 137 MMOL/L — SIGNIFICANT CHANGE UP (ref 135–145)
WBC # BLD: 7 K/UL — SIGNIFICANT CHANGE UP (ref 3.8–10.5)
WBC # FLD AUTO: 7 K/UL — SIGNIFICANT CHANGE UP (ref 3.8–10.5)

## 2018-10-26 PROCEDURE — 99233 SBSQ HOSP IP/OBS HIGH 50: CPT

## 2018-10-26 PROCEDURE — 99232 SBSQ HOSP IP/OBS MODERATE 35: CPT

## 2018-10-26 RX ADMIN — DOCOSANOL 1 APPLICATION(S): 100 CREAM TOPICAL at 08:29

## 2018-10-26 RX ADMIN — LOSARTAN POTASSIUM 50 MILLIGRAM(S): 100 TABLET, FILM COATED ORAL at 06:28

## 2018-10-26 RX ADMIN — DOCOSANOL 1 APPLICATION(S): 100 CREAM TOPICAL at 14:25

## 2018-10-26 RX ADMIN — ENOXAPARIN SODIUM 40 MILLIGRAM(S): 100 INJECTION SUBCUTANEOUS at 14:25

## 2018-10-26 NOTE — PROGRESS NOTE ADULT - SUBJECTIVE AND OBJECTIVE BOX
Patient seen and examined at bedside.    T(C): 36.9 (10-26-18 @ 04:50), Max: 37 (10-25-18 @ 17:52)  HR: 82 (10-26-18 @ 04:50) (79 - 92)  BP: 95/64 (10-26-18 @ 04:50) (95/64 - 109/74)  RR: 18 (10-26-18 @ 04:50) (17 - 18)  SpO2: 99% (10-26-18 @ 04:50) (99% - 100%)  Wt(kg): --    Exam:    Constitutional: No Acute Distress     Neurological: AOx3, Following Commands    Motor exam:          Upper extremity                         Delt     Bicep     Tricep    HG                                                 R         5/5        5/5        5/5       5/5                                               L          5/5        5/5        5/5       5/5          Lower extremity                        HF         KF        KE       DF         PF                                                  R        5/5        5/5        5/5       5/5         5/5                                               L         5/5        5/5       5/5       5/5          5/5                                                 Sensation: [x] intact to light touch  [] decreased:

## 2018-10-26 NOTE — PROGRESS NOTE ADULT - PROBLEM SELECTOR PLAN 1
-MRI spine reveals findings consistent with progressive discitis/osteomyelitis at L1-L2, with prevertebral phlegmon and probable microabscesses in the psoas   muscles  -Likely from recent Trigger point injection  -No indication of sepsis at this time  -ID consult appreciated; No abx at this time. Pending Blood cx results, plan for CT guided biopsy L1-2 with anesthesia 10/29/18 2:30 PM.  -Neurosurgery on board  -Continue to monitor vitals

## 2018-10-26 NOTE — PROGRESS NOTE ADULT - SUBJECTIVE AND OBJECTIVE BOX
ANANYA TOVAR 48y MRN-[id number]    Patient is a 48y old  Female who presents with a chief complaint of Osteomyelitis vs MM (26 Oct 2018 11:45)      Follow Up/CC:  ID following for    Interval History/ROS:    Allergies    No Known Allergies    Intolerances        ANTIMICROBIALS:      MEDICATIONS  (STANDING):  docosanol 10% Cream 1 Application(s) Topical five times a day  enoxaparin Injectable 40 milliGRAM(s) SubCutaneous every 24 hours  influenza   Vaccine 0.5 milliLiter(s) IntraMuscular once  Levomilnacipran (Fetzima) 40 milliGRAM(s) 40 milliGRAM(s) Oral daily  losartan 50 milliGRAM(s) Oral daily    MEDICATIONS  (PRN):        Vital Signs Last 24 Hrs  T(C): 36.9 (26 Oct 2018 09:01), Max: 37 (25 Oct 2018 17:52)  T(F): 98.5 (26 Oct 2018 09:01), Max: 98.6 (25 Oct 2018 17:52)  HR: 80 (26 Oct 2018 09:01) (80 - 92)  BP: 109/75 (26 Oct 2018 09:01) (95/64 - 109/75)  BP(mean): 3 (26 Oct 2018 09:01) (3 - 3)  RR: 18 (26 Oct 2018 09:01) (17 - 18)  SpO2: 99% (26 Oct 2018 09:01) (99% - 100%)    CBC Full  -  ( 26 Oct 2018 07:02 )  WBC Count : 7.0 K/uL  Hemoglobin : 12.6 g/dL  Hematocrit : 37.9 %  Platelet Count - Automated : 316 K/uL  Mean Cell Volume : 87.8 fl  Mean Cell Hemoglobin : 29.3 pg  Mean Cell Hemoglobin Concentration : 33.3 gm/dL  Auto Neutrophil # : x  Auto Lymphocyte # : x  Auto Monocyte # : x  Auto Eosinophil # : x  Auto Basophil # : x  Auto Neutrophil % : x  Auto Lymphocyte % : x  Auto Monocyte % : x  Auto Eosinophil % : x  Auto Basophil % : x    10-    137  |  101  |  11  ----------------------------<  102<H>  5.0   |  26  |  0.56    Ca    9.7      26 Oct 2018 07:01    TPro  7.7  /  Alb  3.7  /  TBili  x   /  DBili  x   /  AST  x   /  ALT  x   /  AlkPhos  x   10-25    LIVER FUNCTIONS - ( 25 Oct 2018 07:51 )  Alb: 3.7 g/dL / Pro: 7.7 g/dL / ALK PHOS: x     / ALT: x     / AST: x     / GGT: x           Urinalysis Basic - ( 24 Oct 2018 21:10 )    Color: Light Yellow / Appearance: Clear / S.010 / pH: x  Gluc: x / Ketone: Small  / Bili: Negative / Urobili: Negative   Blood: x / Protein: Negative / Nitrite: Negative   Leuk Esterase: Negative / RBC: 2 /hpf / WBC 1 /hpf   Sq Epi: x / Non Sq Epi: 1 /hpf / Bacteria: Negative        MICROBIOLOGY:  .Urine Clean Catch (Midstream)  10-25-18   No growth  --  --      .Blood Blood-Venous  10-25-18   No growth to date.  --  --      Rapid RVP Result: NotDetec (10-24 @ 21:10)      RADIOLOGY    MR Lumbar Spine w/ IV Cont (10.25.18 @ 09:13) >  Redemonstration of edema within the L1 and L2 vertebral bodies. New edema   within the L1-L2 intervertebral disc. New small erosions involving the   inferior endplate of L1 and superior endplate of L2. Abnormal enhancement   within the L1 and L2 vertebral bodies and endplates adjoining the L1-L2   disc space. Increased abnormal prevertebral and paravertebral soft tissue   at the L1-L2 level. Probable microabscesses in the psoas muscles.    Findings consistent with progressive discitis/osteomyelitis at L1-L2,   with prevertebral phlegmon and probable microabscesses in the psoas   muscles.

## 2018-10-26 NOTE — PROGRESS NOTE ADULT - SUBJECTIVE AND OBJECTIVE BOX
Mika Hedrick M.D. Pager Number [phone]    Patient is a 48y old  Female who presents with a chief complaint of Osteomyelitis vs MM (26 Oct 2018 05:35)      SUBJECTIVE / OVERNIGHT EVENTS:  Pt seen and examined at bedside. No acute events overnight. Pt denies fever, chills, cp, sob, palpitations, N/V, abd pain. She also denies worsening back pain. She states she has been ambulating without any discomfort.     ROS:  All other review of systems negative    Allergies    No Known Allergies    Intolerances        MEDICATIONS  (STANDING):  docosanol 10% Cream 1 Application(s) Topical five times a day  enoxaparin Injectable 40 milliGRAM(s) SubCutaneous every 24 hours  influenza   Vaccine 0.5 milliLiter(s) IntraMuscular once  Levomilnacipran (Fetzima) 40 milliGRAM(s) 40 milliGRAM(s) Oral daily  losartan 50 milliGRAM(s) Oral daily    MEDICATIONS  (PRN):      Vital Signs Last 24 Hrs  T(C): 36.9 (26 Oct 2018 09:01), Max: 37 (25 Oct 2018 17:52)  T(F): 98.5 (26 Oct 2018 09:01), Max: 98.6 (25 Oct 2018 17:52)  HR: 80 (26 Oct 2018 09:01) (80 - 92)  BP: 109/75 (26 Oct 2018 09:01) (95/64 - 109/75)  BP(mean): 3 (26 Oct 2018 09:01) (3 - 3)  RR: 18 (26 Oct 2018 09:01) (17 - 18)  SpO2: 99% (26 Oct 2018 09:01) (99% - 100%)  CAPILLARY BLOOD GLUCOSE        I&O's Summary    25 Oct 2018 07:  -  26 Oct 2018 07:00  --------------------------------------------------------  IN: 480 mL / OUT: 900 mL / NET: -420 mL    26 Oct 2018 07:  -  26 Oct 2018 11:46  --------------------------------------------------------  IN: 280 mL / OUT: 300 mL / NET: -20 mL        PHYSICAL EXAM:  GENERAL: NAD, well-developed  EYES: EOMI, PERRLA, conjunctiva and sclera clear  NECK: Supple, No JVD  CHEST/LUNG: Clear to auscultation bilaterally; No wheeze  HEART: Regular rate and rhythm; No murmurs, rubs, or gallops  ABDOMEN: Soft, Nontender, Nondistended; Bowel sounds present  EXTREMITIES:  2+ Peripheral Pulses, No clubbing, cyanosis, or edema  MSK: Lower back discomfort to palpation. Full ROM for upper and lower extremities. Full ROM of lower back  NEUROLOGY: AAOx3, non-focal  PSYCH: calm    LABS:                        12.6   7.0   )-----------( 316      ( 26 Oct 2018 07:02 )             37.9     10-    137  |  101  |  11  ----------------------------<  102<H>  5.0   |  26  |  0.56    Ca    9.7      26 Oct 2018 07:01    TPro  7.7  /  Alb  3.7  /  TBili  x   /  DBili  x   /  AST  x   /  ALT  x   /  AlkPhos  x   10-25    PT/INR - ( 24 Oct 2018 21:01 )   PT: 14.2 sec;   INR: 1.30 ratio         PTT - ( 24 Oct 2018 21:01 )  PTT:47.3 sec      Urinalysis Basic - ( 24 Oct 2018 21:10 )    Color: Light Yellow / Appearance: Clear / S.010 / pH: x  Gluc: x / Ketone: Small  / Bili: Negative / Urobili: Negative   Blood: x / Protein: Negative / Nitrite: Negative   Leuk Esterase: Negative / RBC: 2 /hpf / WBC 1 /hpf   Sq Epi: x / Non Sq Epi: 1 /hpf / Bacteria: Negative        RADIOLOGY & ADDITIONAL TESTS:    Imaging Personally Reviewed:    Consultant(s) Notes Reviewed:      Care Discussed with Consultants/Other Providers:    Case Discussed with Family:    Goals of Care:

## 2018-10-26 NOTE — PROGRESS NOTE ADULT - PROBLEM SELECTOR PLAN 1
-hold on abx  -cultures negative so far  -will need a PICC + long term IV abx next week for treatment

## 2018-10-27 PROBLEM — Z00.00 ENCOUNTER FOR PREVENTIVE HEALTH EXAMINATION: Status: ACTIVE | Noted: 2018-10-27

## 2018-10-27 PROCEDURE — 99232 SBSQ HOSP IP/OBS MODERATE 35: CPT

## 2018-10-27 RX ADMIN — DOCOSANOL 1 APPLICATION(S): 100 CREAM TOPICAL at 05:43

## 2018-10-27 RX ADMIN — ENOXAPARIN SODIUM 40 MILLIGRAM(S): 100 INJECTION SUBCUTANEOUS at 13:31

## 2018-10-27 RX ADMIN — DOCOSANOL 1 APPLICATION(S): 100 CREAM TOPICAL at 23:03

## 2018-10-27 RX ADMIN — LOSARTAN POTASSIUM 50 MILLIGRAM(S): 100 TABLET, FILM COATED ORAL at 05:42

## 2018-10-27 RX ADMIN — DOCOSANOL 1 APPLICATION(S): 100 CREAM TOPICAL at 00:31

## 2018-10-27 RX ADMIN — DOCOSANOL 1 APPLICATION(S): 100 CREAM TOPICAL at 13:30

## 2018-10-27 RX ADMIN — DOCOSANOL 1 APPLICATION(S): 100 CREAM TOPICAL at 17:51

## 2018-10-27 RX ADMIN — DOCOSANOL 1 APPLICATION(S): 100 CREAM TOPICAL at 20:04

## 2018-10-27 NOTE — PROGRESS NOTE ADULT - PROBLEM SELECTOR PLAN 1
-MRI spine reveals findings consistent with progressive discitis/osteomyelitis at L1-L2, with prevertebral phlegmon and probable microabscesses in the psoas   muscles  -Likely from recent Trigger point injection  -No indication of sepsis at this time  -ID consult appreciated; No abx at this time as clinically stable, Blood cx NGTD  -plan for CT guided biopsy L1-2 with anesthesia 10/29/18 2:30 PM.  -Neurosurgery on board  -Continue to monitor vitals, if spikes fever or clinical decompensation, would start broad spectrum antibitoics

## 2018-10-27 NOTE — PROGRESS NOTE ADULT - SUBJECTIVE AND OBJECTIVE BOX
Patient is a 48y old  Female who presents with a chief complaint of Osteomyelitis vs MM (26 Oct 2018 14:38)      SUBJECTIVE / OVERNIGHT EVENTS: no overnight events. feels well, back pain is controlled, not using any pain meds. Denies any f/c, LE weakness, saddle anesthesia, incontinence.     MEDICATIONS  (STANDING):  docosanol 10% Cream 1 Application(s) Topical five times a day  enoxaparin Injectable 40 milliGRAM(s) SubCutaneous every 24 hours  influenza   Vaccine 0.5 milliLiter(s) IntraMuscular once  Levomilnacipran (Fetzima) 40 milliGRAM(s) 40 milliGRAM(s) Oral daily  losartan 50 milliGRAM(s) Oral daily    MEDICATIONS  (PRN):      Vital Signs Last 24 Hrs  T(C): 36.6 (27 Oct 2018 12:28), Max: 36.9 (27 Oct 2018 05:27)  T(F): 97.8 (27 Oct 2018 12:28), Max: 98.5 (27 Oct 2018 05:27)  HR: 93 (27 Oct 2018 12:28) (67 - 93)  BP: 119/83 (27 Oct 2018 12:28) (101/68 - 119/83)  BP(mean): --  RR: 18 (27 Oct 2018 12:28) (17 - 18)  SpO2: 95% (27 Oct 2018 12:28) (95% - 99%)  CAPILLARY BLOOD GLUCOSE        I&O's Summary    26 Oct 2018 07:01  -  27 Oct 2018 07:00  --------------------------------------------------------  IN: 1280 mL / OUT: 300 mL / NET: 980 mL    27 Oct 2018 07:01  -  27 Oct 2018 15:09  --------------------------------------------------------  IN: 440 mL / OUT: 300 mL / NET: 140 mL        PHYSICAL EXAM:  GENERAL: NAD, well-developed  HEAD:  Atraumatic, Normocephalic  NECK: Supple, No JVD  CHEST/LUNG: Clear to auscultation bilaterally; No wheeze  HEART: Regular rate and rhythm; No murmurs, rubs, or gallops  ABDOMEN: Soft, Nontender, Nondistended; Bowel sounds present  EXTREMITIES:  2+ Peripheral Pulses, No clubbing, cyanosis, or edema  Back: no tenderness  PSYCH: AAOx3  NEUROLOGY: non-focal, 5/5 all extremites  SKIN: No rashes or lesions    LABS:                        12.6   7.0   )-----------( 316      ( 26 Oct 2018 07:02 )             37.9     10-26    137  |  101  |  11  ----------------------------<  102<H>  5.0   |  26  |  0.56    Ca    9.7      26 Oct 2018 07:01                Culture - Urine (collected 25 Oct 2018 00:40)  Source: .Urine Clean Catch (Midstream)  Final Report (25 Oct 2018 22:53):    No growth    Culture - Blood (collected 25 Oct 2018 00:37)  Source: .Blood Blood-Peripheral  Preliminary Report (26 Oct 2018 01:01):    No growth to date.    Culture - Blood (collected 25 Oct 2018 00:37)  Source: .Blood Blood-Venous  Preliminary Report (26 Oct 2018 01:01):    No growth to date.          RADIOLOGY & ADDITIONAL TESTS:    Imaging Personally Reviewed: MRI L spine: INTERPRETATION:  MRI of the lumbar spine with contrast    CLINICAL INDICATION:  Osteomyelitis    TECHNIQUE: Multiplanar, multisequence MR images of the lumbar spine were   obtained before and after the intravenous administration of 6 cc of   Gadavist. 1.5 cc were discarded.    COMPARISON: MRI lumbar spine 10/11/2018    FINDINGS:    Redemonstration of edema within the L1 and L2 vertebral bodies. New edema   within the L1-L2 intervertebral disc. New small erosions involving the   inferior endplate of L1 and superior endplate of L2. Abnormal enhancement   within the L1 and L2 vertebral bodies and endplates adjoining the L1-L2   disc space. Increased abnormal prevertebral and paravertebral soft tissue   at the L1-L2 level. Probable microabscesses in the psoas muscles.    Remaining vertebral bodies demonstrate normal marrow signal homogeneity.   Vertebral body height and alignment are maintained. Disc space narrowing   at L1-L2. The conus is normal in size, position, and signal   characteristics, ending at L1.    T12-L1: No spinal canal stenosis or neural foraminal narrowing.    L1-L2: No spinal canal stenosis or neural foraminal narrowing.    L2-L3: No spinal canal stenosis or neural foraminal narrowing.    L3-L4: No spinal canal stenosis or neural foraminal narrowing.    L4-L5: Minimal broad-based disc protrusion. No spinal canal stenosis or   neural foraminal narrowing.    L5-S1: Mild bilateral facet hypertrophy. No spinal canal stenosis or   neural foraminal narrowing.    IMPRESSION:    Redemonstration of edema within the L1 and L2 vertebral bodies. New edema   within the L1-L2 intervertebral disc. New small erosions involving the   inferior endplate of L1 and superior endplate of L2. Abnormal enhancement   within the L1 and L2 vertebral bodies and endplates adjoining the L1-L2   disc space. Increased abnormal prevertebral and paravertebral soft tissue   at the L1-L2 level. Probable microabscesses in the psoas muscles.    Findings consistent with progressive discitis/osteomyelitis at L1-L2,   with prevertebral phlegmon and probable microabscesses in the psoas   muscles.    No spinal canal stenosis or neural foraminal narrowing.      Consultant(s) Notes Reviewed:  ID and neurosurgery    Care Discussed with Consultants/Other Providers:

## 2018-10-28 LAB
ANION GAP SERPL CALC-SCNC: 13 MMOL/L — SIGNIFICANT CHANGE UP (ref 5–17)
BUN SERPL-MCNC: 12 MG/DL — SIGNIFICANT CHANGE UP (ref 7–23)
CALCIUM SERPL-MCNC: 9.4 MG/DL — SIGNIFICANT CHANGE UP (ref 8.4–10.5)
CHLORIDE SERPL-SCNC: 104 MMOL/L — SIGNIFICANT CHANGE UP (ref 96–108)
CO2 SERPL-SCNC: 23 MMOL/L — SIGNIFICANT CHANGE UP (ref 22–31)
CREAT SERPL-MCNC: 0.54 MG/DL — SIGNIFICANT CHANGE UP (ref 0.5–1.3)
GLUCOSE SERPL-MCNC: 89 MG/DL — SIGNIFICANT CHANGE UP (ref 70–99)
HCT VFR BLD CALC: 36.3 % — SIGNIFICANT CHANGE UP (ref 34.5–45)
HGB BLD-MCNC: 12 G/DL — SIGNIFICANT CHANGE UP (ref 11.5–15.5)
MCHC RBC-ENTMCNC: 28.9 PG — SIGNIFICANT CHANGE UP (ref 27–34)
MCHC RBC-ENTMCNC: 33.1 GM/DL — SIGNIFICANT CHANGE UP (ref 32–36)
MCV RBC AUTO: 87.5 FL — SIGNIFICANT CHANGE UP (ref 80–100)
PLATELET # BLD AUTO: 317 K/UL — SIGNIFICANT CHANGE UP (ref 150–400)
POTASSIUM SERPL-MCNC: 4.1 MMOL/L — SIGNIFICANT CHANGE UP (ref 3.5–5.3)
POTASSIUM SERPL-SCNC: 4.1 MMOL/L — SIGNIFICANT CHANGE UP (ref 3.5–5.3)
RBC # BLD: 4.15 M/UL — SIGNIFICANT CHANGE UP (ref 3.8–5.2)
RBC # FLD: 14.4 % — SIGNIFICANT CHANGE UP (ref 10.3–14.5)
SODIUM SERPL-SCNC: 140 MMOL/L — SIGNIFICANT CHANGE UP (ref 135–145)
WBC # BLD: 7.1 K/UL — SIGNIFICANT CHANGE UP (ref 3.8–10.5)
WBC # FLD AUTO: 7.1 K/UL — SIGNIFICANT CHANGE UP (ref 3.8–10.5)

## 2018-10-28 PROCEDURE — 99232 SBSQ HOSP IP/OBS MODERATE 35: CPT

## 2018-10-28 RX ADMIN — DOCOSANOL 1 APPLICATION(S): 100 CREAM TOPICAL at 18:02

## 2018-10-28 RX ADMIN — DOCOSANOL 1 APPLICATION(S): 100 CREAM TOPICAL at 23:13

## 2018-10-28 RX ADMIN — DOCOSANOL 1 APPLICATION(S): 100 CREAM TOPICAL at 19:39

## 2018-10-28 RX ADMIN — ENOXAPARIN SODIUM 40 MILLIGRAM(S): 100 INJECTION SUBCUTANEOUS at 09:18

## 2018-10-28 RX ADMIN — DOCOSANOL 1 APPLICATION(S): 100 CREAM TOPICAL at 09:18

## 2018-10-28 RX ADMIN — DOCOSANOL 1 APPLICATION(S): 100 CREAM TOPICAL at 05:20

## 2018-10-28 RX ADMIN — LOSARTAN POTASSIUM 50 MILLIGRAM(S): 100 TABLET, FILM COATED ORAL at 05:20

## 2018-10-28 NOTE — PROGRESS NOTE ADULT - PROBLEM SELECTOR PLAN 1
-MRI spine reveals findings consistent with progressive discitis/osteomyelitis at L1-L2, with prevertebral phlegmon and probable microabscesses in the psoas   muscles  -Likely from recent Trigger point injection  -No indication of sepsis at this time  -ID consult appreciated; No abx at this time as clinically stable, Blood cx NGTD  -plan for CT guided biopsy L1-2 with anesthesia 10/29/18 2:30 PM, NPO after MN  -Neurosurgery on board  -Continue to monitor vitals, if spikes fever or clinical decompensation, would start broad spectrum antibiotics

## 2018-10-28 NOTE — PROGRESS NOTE ADULT - SUBJECTIVE AND OBJECTIVE BOX
Patient is a 48y old  Female who presents with a chief complaint of Osteomyelitis vs MM (27 Oct 2018 15:09)      SUBJECTIVE / OVERNIGHT EVENTS: no overnight events. feels well, minimal back pain, not using any pain meds. Denies any f/c, LE weakness, saddle anesthesia, incontinence.     MEDICATIONS  (STANDING):  docosanol 10% Cream 1 Application(s) Topical five times a day  influenza   Vaccine 0.5 milliLiter(s) IntraMuscular once  Levomilnacipran (Fetzima) 40 milliGRAM(s) 40 milliGRAM(s) Oral daily  losartan 50 milliGRAM(s) Oral daily    MEDICATIONS  (PRN):      Vital Signs Last 24 Hrs  T(C): 36.7 (28 Oct 2018 04:23), Max: 36.8 (27 Oct 2018 20:12)  T(F): 98.1 (28 Oct 2018 04:23), Max: 98.3 (27 Oct 2018 20:12)  HR: 74 (28 Oct 2018 04:23) (74 - 84)  BP: 102/69 (28 Oct 2018 04:23) (102/69 - 110/76)  BP(mean): 87 (27 Oct 2018 20:12) (87 - 87)  RR: 18 (28 Oct 2018 04:23) (18 - 18)  SpO2: 100% (28 Oct 2018 04:23) (93% - 100%)  CAPILLARY BLOOD GLUCOSE        I&O's Summary    27 Oct 2018 07:01  -  28 Oct 2018 07:00  --------------------------------------------------------  IN: 1680 mL / OUT: 300 mL / NET: 1380 mL    28 Oct 2018 07:01  -  28 Oct 2018 13:25  --------------------------------------------------------  IN: 300 mL / OUT: 0 mL / NET: 300 mL        PHYSICAL EXAM:  GENERAL: NAD, well-developed  HEAD:  Atraumatic, Normocephalic  NECK: Supple, No JVD  CHEST/LUNG: Clear to auscultation bilaterally; No wheeze  HEART: Regular rate and rhythm; No murmurs, rubs, or gallops  Back no tenderness  ABDOMEN: Soft, Nontender, Nondistended; Bowel sounds present  EXTREMITIES:  2+ Peripheral Pulses, No clubbing, cyanosis, or edema  PSYCH: AAOx3  NEUROLOGY: non-focal  SKIN: No rashes or lesions    LABS:                        12.0   7.10  )-----------( 317      ( 28 Oct 2018 08:32 )             36.3     10-28    140  |  104  |  12  ----------------------------<  89  4.1   |  23  |  0.54    Ca    9.4      28 Oct 2018 05:44                    RADIOLOGY & ADDITIONAL TESTS:    Imaging Personally Reviewed:    Consultant(s) Notes Reviewed:  ID    Care Discussed with Consultants/Other Providers:

## 2018-10-29 ENCOUNTER — APPOINTMENT (OUTPATIENT)
Dept: CT IMAGING | Facility: HOSPITAL | Age: 48
End: 2018-10-29

## 2018-10-29 LAB
% GAMMA, URINE: 35.9 % — SIGNIFICANT CHANGE UP
ALBUMIN 24H MFR UR ELPH: 22.2 % — SIGNIFICANT CHANGE UP
ALPHA1 GLOB 24H MFR UR ELPH: 11.6 % — SIGNIFICANT CHANGE UP
ALPHA2 GLOB 24H MFR UR ELPH: 17.3 % — SIGNIFICANT CHANGE UP
ANION GAP SERPL CALC-SCNC: 13 MMOL/L — SIGNIFICANT CHANGE UP (ref 5–17)
APTT BLD: 41.6 SEC — HIGH (ref 27.5–37.4)
B-GLOBULIN 24H MFR UR ELPH: 13 % — SIGNIFICANT CHANGE UP
BUN SERPL-MCNC: 12 MG/DL — SIGNIFICANT CHANGE UP (ref 7–23)
CALCIUM SERPL-MCNC: 9.5 MG/DL — SIGNIFICANT CHANGE UP (ref 8.4–10.5)
CHLORIDE SERPL-SCNC: 102 MMOL/L — SIGNIFICANT CHANGE UP (ref 96–108)
CO2 SERPL-SCNC: 23 MMOL/L — SIGNIFICANT CHANGE UP (ref 22–31)
COLLECT DURATION TIME UR: 24 HR — SIGNIFICANT CHANGE UP
CREAT SERPL-MCNC: 0.56 MG/DL — SIGNIFICANT CHANGE UP (ref 0.5–1.3)
GLUCOSE SERPL-MCNC: 83 MG/DL — SIGNIFICANT CHANGE UP (ref 70–99)
GRAM STN FLD: SIGNIFICANT CHANGE UP
HCT VFR BLD CALC: 36.7 % — SIGNIFICANT CHANGE UP (ref 34.5–45)
HGB BLD-MCNC: 12.1 G/DL — SIGNIFICANT CHANGE UP (ref 11.5–15.5)
INR BLD: 1.23 RATIO — HIGH (ref 0.88–1.16)
INTERPRETATION 24H UR IFE-IMP: SIGNIFICANT CHANGE UP
M PROTEIN 24H UR ELPH-MRATE: 0 MG/24HR — SIGNIFICANT CHANGE UP (ref 0–0)
M PROTEIN 24H UR ELPH-MRATE: 0 MG/DL — SIGNIFICANT CHANGE UP
MCHC RBC-ENTMCNC: 28.9 PG — SIGNIFICANT CHANGE UP (ref 27–34)
MCHC RBC-ENTMCNC: 33 GM/DL — SIGNIFICANT CHANGE UP (ref 32–36)
MCV RBC AUTO: 87.8 FL — SIGNIFICANT CHANGE UP (ref 80–100)
PLATELET # BLD AUTO: 319 K/UL — SIGNIFICANT CHANGE UP (ref 150–400)
POTASSIUM SERPL-MCNC: 4.2 MMOL/L — SIGNIFICANT CHANGE UP (ref 3.5–5.3)
POTASSIUM SERPL-SCNC: 4.2 MMOL/L — SIGNIFICANT CHANGE UP (ref 3.5–5.3)
PROT ?TM UR-MCNC: 13 MG/DL — HIGH (ref 0–12)
PROT PATTERN 24H UR ELPH-IMP: SIGNIFICANT CHANGE UP
PROTEIN QUANT CALC, URINE: 176 MG/24 H — HIGH (ref 50–100)
PROTHROM AB SERPL-ACNC: 13.9 SEC — HIGH (ref 10–13.1)
RBC # BLD: 4.18 M/UL — SIGNIFICANT CHANGE UP (ref 3.8–5.2)
RBC # FLD: 14.5 % — SIGNIFICANT CHANGE UP (ref 10.3–14.5)
SODIUM SERPL-SCNC: 138 MMOL/L — SIGNIFICANT CHANGE UP (ref 135–145)
SPECIMEN SOURCE: SIGNIFICANT CHANGE UP
TOTAL VOLUME - 24 HOUR: 1350 ML — SIGNIFICANT CHANGE UP
URINE CREATININE CALCULATION: 1.1 G/24 H — SIGNIFICANT CHANGE UP (ref 0.8–1.8)
WBC # BLD: 7.14 K/UL — SIGNIFICANT CHANGE UP (ref 3.8–10.5)
WBC # FLD AUTO: 7.14 K/UL — SIGNIFICANT CHANGE UP (ref 3.8–10.5)

## 2018-10-29 PROCEDURE — 99232 SBSQ HOSP IP/OBS MODERATE 35: CPT

## 2018-10-29 PROCEDURE — 99233 SBSQ HOSP IP/OBS HIGH 50: CPT

## 2018-10-29 PROCEDURE — 77012 CT SCAN FOR NEEDLE BIOPSY: CPT | Mod: 26

## 2018-10-29 PROCEDURE — 10022: CPT

## 2018-10-29 RX ORDER — CEFTRIAXONE 500 MG/1
2 INJECTION, POWDER, FOR SOLUTION INTRAMUSCULAR; INTRAVENOUS EVERY 24 HOURS
Qty: 0 | Refills: 0 | Status: DISCONTINUED | OUTPATIENT
Start: 2018-10-29 | End: 2018-10-31

## 2018-10-29 RX ORDER — ACETAMINOPHEN 500 MG
650 TABLET ORAL ONCE
Qty: 0 | Refills: 0 | Status: COMPLETED | OUTPATIENT
Start: 2018-10-29 | End: 2018-10-30

## 2018-10-29 RX ORDER — VANCOMYCIN HCL 1 G
1000 VIAL (EA) INTRAVENOUS EVERY 12 HOURS
Qty: 0 | Refills: 0 | Status: DISCONTINUED | OUTPATIENT
Start: 2018-10-29 | End: 2018-10-31

## 2018-10-29 RX ADMIN — Medication 250 MILLIGRAM(S): at 22:45

## 2018-10-29 RX ADMIN — DOCOSANOL 1 APPLICATION(S): 100 CREAM TOPICAL at 21:35

## 2018-10-29 RX ADMIN — DOCOSANOL 1 APPLICATION(S): 100 CREAM TOPICAL at 06:14

## 2018-10-29 RX ADMIN — DOCOSANOL 1 APPLICATION(S): 100 CREAM TOPICAL at 23:13

## 2018-10-29 RX ADMIN — CEFTRIAXONE 100 GRAM(S): 500 INJECTION, POWDER, FOR SOLUTION INTRAMUSCULAR; INTRAVENOUS at 21:34

## 2018-10-29 RX ADMIN — LOSARTAN POTASSIUM 50 MILLIGRAM(S): 100 TABLET, FILM COATED ORAL at 06:14

## 2018-10-29 RX ADMIN — DOCOSANOL 1 APPLICATION(S): 100 CREAM TOPICAL at 11:25

## 2018-10-29 NOTE — PROGRESS NOTE ADULT - SUBJECTIVE AND OBJECTIVE BOX
Patient is a 48y old  Female who presents with a chief complaint of Osteomyelitis vs MM (28 Oct 2018 13:24)      INTERVAL HPI/OVERNIGHT EVENTS:  47 yo F with PMH HTN, UC (on Remicade), depression sent in to the hospital on 10/25/18 from neurosurgeon's office (Dr. Usman Craig) for further evaluation of PET scan findings concerning for osteomyelitis vs. MM.  In the ED, patient received a dose of Zosyn and Vancomycin, she was seen by Neurosurgery with no acute neurosurgical intervention at this time.  Patient was seen by ID / Blood culture is negative.        Medications:MEDICATIONS  (STANDING):  docosanol 10% Cream 1 Application(s) Topical five times a day  influenza   Vaccine 0.5 milliLiter(s) IntraMuscular once  Levomilnacipran (Fetzima) 40 milliGRAM(s) 40 milliGRAM(s) Oral daily  losartan 50 milliGRAM(s) Oral daily    MEDICATIONS  (PRN):      Allergies: Allergies    No Known Allergies    Intolerances        REVIEW OF SYSTEMS:  CONSTITUTIONAL: No fever, weight loss, or fatigue  EYES: No eye pain, visual disturbances, or discharge  ENMT:  No difficulty hearing, tinnitus, vertigo; No sinus or throat pain  NECK: No pain or stiffness  BREASTS: No pain, masses, or nipple discharge  RESPIRATORY: No cough, wheezing, chills or hemoptysis; No shortness of breath  CARDIOVASCULAR: No chest pain, palpitations, dizziness, or leg swelling  GASTROINTESTINAL: No abdominal or epigastric pain. No nausea, vomiting, or hematemesis; No diarrhea or constipation. No melena or hematochezia.  GENITOURINARY: No dysuria, frequency, hematuria, or incontinence  NEUROLOGICAL: No headaches, memory loss, loss of strength, numbness, or tremors  SKIN: No itching, burning, rashes, or lesions   LYMPH NODES: No enlarged glands  ENDOCRINE: No heat or cold intolerance; No hair loss  MUSCULOSKELETAL: No joint pain or swelling; No muscle, back, or extremity pain  PSYCHIATRIC: No depression, anxiety, mood swings, or difficulty sleeping  HEME/LYMPH: No easy bruising, or bleeding gums  ALLERY AND IMMUNOLOGIC: No hives or eczema    T(C): 37 (10-29-18 @ 04:36), Max: 37 (10-29-18 @ 04:36)  HR: 75 (10-29-18 @ 06:13) (67 - 95)  BP: 102/64 (10-29-18 @ 06:13) (102/64 - 115/79)  RR: 18 (10-29-18 @ 04:36) (18 - 18)  SpO2: 99% (10-29-18 @ 04:36) (97% - 99%)  Wt(kg): --Vital Signs Last 24 Hrs  T(C): 37 (29 Oct 2018 04:36), Max: 37 (29 Oct 2018 04:36)  T(F): 98.6 (29 Oct 2018 04:36), Max: 98.6 (29 Oct 2018 04:36)  HR: 75 (29 Oct 2018 06:13) (67 - 95)  BP: 102/64 (29 Oct 2018 06:13) (102/64 - 115/79)  BP(mean): --  RR: 18 (29 Oct 2018 04:36) (18 - 18)  SpO2: 99% (29 Oct 2018 04:36) (97% - 99%)  I&O's Summary    28 Oct 2018 07:01  -  29 Oct 2018 07:00  --------------------------------------------------------  IN: 1050 mL / OUT: 0 mL / NET: 1050 mL      Last Menstrual Period      PHYSICAL EXAM:  GENERAL: NAD, well-groomed, well-developed  HEAD:  Atraumatic, Normocephalic  EYES: EOMI, PERRLA, conjunctiva and sclera clear  ENMT: No tonsillar erythema, exudates, or enlargement; Moist mucous membranes, Good dentition, No lesions  NECK: Supple, No JVD, Normal thyroid  NERVOUS SYSTEM:  Alert & Oriented X3, Good concentration; Motor Strength 5/5 B/L upper and lower extremities; DTRs 2+ intact and symmetric  CHEST/LUNG: Clear to percussion bilaterally; No rales, rhonchi, wheezing, or rubs  HEART: Regular rate and rhythm; No murmurs, rubs, or gallops  ABDOMEN: Soft, Nontender, Nondistended; Bowel sounds present  EXTREMITIES:  2+ Peripheral Pulses, No clubbing, cyanosis, or edema  LYMPH: No lymphadenopathy noted  SKIN: No rashes or lesions    Consultant(s) Notes Reviewed:  [x ] YES  [ ] NO  Care Discussed with Consultants/Other Providers [ x] YES  [ ] NO  Name of Consultant  LABS:                        12.1   7.14  )-----------( 319      ( 29 Oct 2018 09:34 )             36.7     10-29    138  |  102  |  12  ----------------------------<  83  4.2   |  23  |  0.56    Ca    9.5      29 Oct 2018 06:26      PT/INR - ( 29 Oct 2018 09:29 )   PT: 13.9 sec;   INR: 1.23 ratio         PTT - ( 29 Oct 2018 09:29 )  PTT:41.6 sec    CAPILLARY BLOOD GLUCOSE                RADIOLOGY & ADDITIONAL TESTS:  EKG :     Imaging Personally Reviewed:  [ ] YES  [ ] NO  HEALTH ISSUES - PROBLEM Dx:  Medication monitoring encounter: Medication monitoring encounter  Long term (current) use of antibiotics: Long term (current) use of antibiotics  Other acute osteomyelitis, other site: Other acute osteomyelitis, other site  Elevated C-reactive protein: Elevated C-reactive protein  Elevated erythrocyte sedimentation rate: Elevated erythrocyte sedimentation rate  Back pain: Back pain  Psoas abscess: Psoas abscess  Vertebral osteomyelitis: Vertebral osteomyelitis  Vertebral anomaly: Vertebral anomaly  Depression: Depression  Prophylactic measure: Prophylactic measure  HTN (hypertension): HTN (hypertension)  Ulcerative colitis: Ulcerative colitis Patient is a 48y old  Female who presents with a chief complaint of Osteomyelitis vs MM (28 Oct 2018 13:24)      INTERVAL HPI/OVERNIGHT EVENTS:  49 yo F with PMH HTN, UC (on Remicade), depression sent in to the hospital on 10/25/18 from neurosurgeon's office (Dr. Usmna Craig) for further evaluation of PET scan findings concerning for osteomyelitis vs. MM.  In the ED, patient received a dose of Zosyn and Vancomycin, she was seen by Neurosurgery with no acute neurosurgical intervention at this time.  Patient was seen by ID / Blood culture is negative.        Medications:MEDICATIONS  (STANDING):  docosanol 10% Cream 1 Application(s) Topical five times a day  influenza   Vaccine 0.5 milliLiter(s) IntraMuscular once  Levomilnacipran (Fetzima) 40 milliGRAM(s) 40 milliGRAM(s) Oral daily  losartan 50 milliGRAM(s) Oral daily    MEDICATIONS  (PRN):      Allergies: Allergies    No Known Allergies    Intolerances        REVIEW OF SYSTEMS:  CONSTITUTIONAL: No fever  NECK: No pain or stiffness  RESPIRATORY: No cough, wheezing, chills or hemoptysis; No shortness of breath  CARDIOVASCULAR: No chest pain, palpitations, dizziness, or leg swelling  GASTROINTESTINAL: No abdominal or epigastric pain. No nausea, vomiting, or hematemesis; No diarrhea or constipation. No melena or hematochezia.  GENITOURINARY: No dysuria  NEUROLOGICAL: No headaches  SKIN: No itching, burning, rashes, or lesions   PSYCHIATRIC: No depression      T(C): 37 (10-29-18 @ 04:36), Max: 37 (10-29-18 @ 04:36)  HR: 75 (10-29-18 @ 06:13) (67 - 95)  BP: 102/64 (10-29-18 @ 06:13) (102/64 - 115/79)  RR: 18 (10-29-18 @ 04:36) (18 - 18)  SpO2: 99% (10-29-18 @ 04:36) (97% - 99%)  Wt(kg): --Vital Signs Last 24 Hrs  T(C): 37 (29 Oct 2018 04:36), Max: 37 (29 Oct 2018 04:36)  T(F): 98.6 (29 Oct 2018 04:36), Max: 98.6 (29 Oct 2018 04:36)  HR: 75 (29 Oct 2018 06:13) (67 - 95)  BP: 102/64 (29 Oct 2018 06:13) (102/64 - 115/79)  BP(mean): --  RR: 18 (29 Oct 2018 04:36) (18 - 18)  SpO2: 99% (29 Oct 2018 04:36) (97% - 99%)  I&O's Summary    28 Oct 2018 07:01  -  29 Oct 2018 07:00  --------------------------------------------------------  IN: 1050 mL / OUT: 0 mL / NET: 1050 mL      PHYSICAL EXAM:  GENERAL: NAD, well-groomed, well-developed  HEAD:  Atraumatic, Normocephalic  NECK: Supple, No JVD, Normal thyroid  NERVOUS SYSTEM:  Alert & Oriented X3, Good concentration  CHEST/LUNG: Clear to percussion bilaterally; No rales, rhonchi, wheezing, or rubs  HEART: Regular rate and rhythm; No murmurs, rubs, or gallops  ABDOMEN: Soft, Nontender, Nondistended; Bowel sounds present  EXTREMITIES:  2+ Peripheral Pulses, No clubbing, cyanosis, or edema      Consultant(s) Notes Reviewed:  [x ] YES  [ ] NO  Care Discussed with Consultants/Other Providers [ x] YES  [ ] NO  Name of Consultant  LABS:                        12.1   7.14  )-----------( 319      ( 29 Oct 2018 09:34 )             36.7     10-29    138  |  102  |  12  ----------------------------<  83  4.2   |  23  |  0.56    Ca    9.5      29 Oct 2018 06:26      PT/INR - ( 29 Oct 2018 09:29 )   PT: 13.9 sec;   INR: 1.23 ratio         PTT - ( 29 Oct 2018 09:29 )  PTT:41.6 sec    CAPILLARY BLOOD GLUCOSE                RADIOLOGY & ADDITIONAL TESTS:  EKG :     Imaging Personally Reviewed:  [ ] YES  [ ] NO  HEALTH ISSUES - PROBLEM Dx:  Medication monitoring encounter: Medication monitoring encounter  Long term (current) use of antibiotics: Long term (current) use of antibiotics  Other acute osteomyelitis, other site: Other acute osteomyelitis, other site  Elevated C-reactive protein: Elevated C-reactive protein  Elevated erythrocyte sedimentation rate: Elevated erythrocyte sedimentation rate  Back pain: Back pain  Psoas abscess: Psoas abscess  Vertebral osteomyelitis: Vertebral osteomyelitis  Vertebral anomaly: Vertebral anomaly  Depression: Depression  Prophylactic measure: Prophylactic measure  HTN (hypertension): HTN (hypertension)  Ulcerative colitis: Ulcerative colitis

## 2018-10-29 NOTE — PROGRESS NOTE ADULT - PROBLEM SELECTOR PLAN 1
-MRI spine reveals findings consistent with progressive discitis/osteomyelitis at L1-L2, with prevertebral phlegmon and probable microabscesses in the psoas   muscles  -Likely from recent Trigger point injection  -No indication of sepsis at this time  -ID consult appreciated; No abx at this time as clinically stable, Blood cx NGTD  -plan for CT guided biopsy L1-2 with anesthesia 10/29/18 2:30 PM, NPO after MN  -Neurosurgery on board  -Continue to monitor vitals, if spikes fever or clinical decompensation, would start broad spectrum antibiotics -MRI spine reveals findings consistent with progressive discitis/osteomyelitis at L1-L2, with prevertebral phlegmon and probable microabscesses in the psoas   muscles  -could be from recent Trigger point injection  -No indication of sepsis at this time  -ID consult appreciated; No abx at this time as clinically stable, Blood cx NGTD  -plan for CT guided biopsy L1-2 with anesthesia 10/29/18 2:30 PM, NPO after MN  -Neurosurgery on board  - check fluid cx for bacterial, fungal , AFB + path form Biopsy specimen   -start vancomycin 1 gm iv q12 + Ceftriaxone 2 gm iv q24 empirically after biopsy today ( 10/29/18)   -will arrange for PICC tomorrow   - F/UP Vancomycin through pre 4th dose   - will f/up Gram stain once done and cultures and path   - will need to montior renal function as well

## 2018-10-29 NOTE — PROGRESS NOTE ADULT - PROBLEM SELECTOR PLAN 1
-cultures negative so far  -will need a PICC + long term IV abx next week for treatment  -for biopsy today

## 2018-10-29 NOTE — PROGRESS NOTE ADULT - PROBLEM SELECTOR PLAN 6
-potential side effects of PICC explained including bleeding and infection  -potential side effects of abx explained including GI, Cdiff, allergy issues, development of resistance, etc.  -plan 8 weeks abx for spine OM/psoas abscess

## 2018-10-29 NOTE — PROGRESS NOTE ADULT - PROBLEM SELECTOR PLAN 4
-On Fetzima (SNRI).  -Not on formulary. Pt will have to bring in her own. -On Fetzima (SNRI) from home   -Not on formulary. Pt will have to bring in her own.

## 2018-10-29 NOTE — PROGRESS NOTE ADULT - SUBJECTIVE AND OBJECTIVE BOX
Clinical Indication: MRI suggestive of L1-2 osteomyelitis and discitis with paraspinal phlegmon, negative blood cultures, on remicade for UC, history of trigger point injections several weeks ago for back pain after back strain from slip in shower    PREPROCEDURE:    Patient presents for CT spiration of L1-2 paraspinal phlegmonous collection under anesthesia sedaton.  Risks and benefits were discussed with patient and/or health care proxy.  Risks include but are not limited to headache, bleeding, infection.    Patient and/or health care proxy understands and consents to procedure.    POSTPROCEDURE:     20 g 10 cm Chiba needle inserted into left anterior lateral paraspinal soft tissue at L1-2 level. Less than one cc of bloody fluid was aspirated x2 and mixed with normal saline. Specimen hand delivered to the lab. Patient left department to recover in recovery room prior to discharge to floor.    Patient tolerated the procedure well.

## 2018-10-29 NOTE — PROGRESS NOTE ADULT - PROBLEM SELECTOR PLAN 6
DVT ppx: stop lovenox for biopsy randy DVT ppx: stop lovenox for biopsy and restart about 4 hours post procedure if ok with Performing provider

## 2018-10-29 NOTE — PROGRESS NOTE ADULT - PROBLEM SELECTOR PLAN 3
-Chronic  -Continue Remicade. -Chronic  - Pt will discuss with her GI about Remicade since she will be on Antibiotics post biopsy

## 2018-10-29 NOTE — PROGRESS NOTE ADULT - SUBJECTIVE AND OBJECTIVE BOX
ANANYA TOVAR 48y [id number]    Patient is a 48y old  Female who presents with a chief complaint of Osteomyelitis vs MM (29 Oct 2018 11:28)      Follow Up/CC:  ID following for spine OM    Interval History/ROS: waiting for biopsy    Allergies    No Known Allergies    Intolerances        ANTIMICROBIALS:      MEDICATIONS  (STANDING):  docosanol 10% Cream 1 Application(s) Topical five times a day  influenza   Vaccine 0.5 milliLiter(s) IntraMuscular once  Levomilnacipran (Fetzima) 40 milliGRAM(s) 40 milliGRAM(s) Oral daily  losartan 50 milliGRAM(s) Oral daily    MEDICATIONS  (PRN):        Vital Signs Last 24 Hrs  T(C): 37 (29 Oct 2018 04:36), Max: 37 (29 Oct 2018 04:36)  T(F): 98.6 (29 Oct 2018 04:36), Max: 98.6 (29 Oct 2018 04:36)  HR: 75 (29 Oct 2018 06:13) (67 - 95)  BP: 102/64 (29 Oct 2018 06:13) (102/64 - 115/79)  BP(mean): --  RR: 18 (29 Oct 2018 04:36) (18 - 18)  SpO2: 99% (29 Oct 2018 04:36) (97% - 99%)    CBC Full  -  ( 29 Oct 2018 09:34 )  WBC Count : 7.14 K/uL  Hemoglobin : 12.1 g/dL  Hematocrit : 36.7 %  Platelet Count - Automated : 319 K/uL  Mean Cell Volume : 87.8 fl  Mean Cell Hemoglobin : 28.9 pg  Mean Cell Hemoglobin Concentration : 33.0 gm/dL  Auto Neutrophil # : x  Auto Lymphocyte # : x  Auto Monocyte # : x  Auto Eosinophil # : x  Auto Basophil # : x  Auto Neutrophil % : x  Auto Lymphocyte % : x  Auto Monocyte % : x  Auto Eosinophil % : x  Auto Basophil % : x    10-29    138  |  102  |  12  ----------------------------<  83  4.2   |  23  |  0.56    Ca    9.5      29 Oct 2018 06:26            MICROBIOLOGY:  .Urine Clean Catch (Midstream)  10-25-18   No growth  --  --      .Blood Blood-Venous  10-25-18   No growth to date.  --  --        Rapid RVP Result: NotDetec (10-24 @ 21:10)      RADIOLOGY    < from: MR Lumbar Spine w/ IV Cont (10.25.18 @ 09:13) >  Redemonstration of edema within the L1 and L2 vertebral bodies. New edema   within the L1-L2 intervertebral disc. New small erosions involving the   inferior endplate of L1 and superior endplate of L2. Abnormal enhancement   within the L1 and L2 vertebral bodies and endplates adjoining the L1-L2   disc space. Increased abnormal prevertebral and paravertebral soft tissue   at the L1-L2 level. Probable microabscesses in the psoas muscles.    Findings consistent with progressive discitis/osteomyelitis at L1-L2,   with prevertebral phlegmon and probable microabscesses in the psoas   muscles.    No spinal canal stenosis or neural foraminal narrowing.

## 2018-10-30 ENCOUNTER — TRANSCRIPTION ENCOUNTER (OUTPATIENT)
Age: 48
End: 2018-10-30

## 2018-10-30 LAB
ANION GAP SERPL CALC-SCNC: 10 MMOL/L — SIGNIFICANT CHANGE UP (ref 5–17)
BUN SERPL-MCNC: 11 MG/DL — SIGNIFICANT CHANGE UP (ref 7–23)
CALCIUM SERPL-MCNC: 9.6 MG/DL — SIGNIFICANT CHANGE UP (ref 8.4–10.5)
CHLORIDE SERPL-SCNC: 103 MMOL/L — SIGNIFICANT CHANGE UP (ref 96–108)
CO2 SERPL-SCNC: 26 MMOL/L — SIGNIFICANT CHANGE UP (ref 22–31)
CREAT SERPL-MCNC: 0.54 MG/DL — SIGNIFICANT CHANGE UP (ref 0.5–1.3)
CULTURE RESULTS: SIGNIFICANT CHANGE UP
CULTURE RESULTS: SIGNIFICANT CHANGE UP
GLUCOSE SERPL-MCNC: 104 MG/DL — HIGH (ref 70–99)
HCT VFR BLD CALC: 37.1 % — SIGNIFICANT CHANGE UP (ref 34.5–45)
HGB BLD-MCNC: 11.7 G/DL — SIGNIFICANT CHANGE UP (ref 11.5–15.5)
MCHC RBC-ENTMCNC: 28.5 PG — SIGNIFICANT CHANGE UP (ref 27–34)
MCHC RBC-ENTMCNC: 31.5 GM/DL — LOW (ref 32–36)
MCV RBC AUTO: 90.3 FL — SIGNIFICANT CHANGE UP (ref 80–100)
NIGHT BLUE STAIN TISS: SIGNIFICANT CHANGE UP
PLATELET # BLD AUTO: 369 K/UL — SIGNIFICANT CHANGE UP (ref 150–400)
POTASSIUM SERPL-MCNC: 4.2 MMOL/L — SIGNIFICANT CHANGE UP (ref 3.5–5.3)
POTASSIUM SERPL-SCNC: 4.2 MMOL/L — SIGNIFICANT CHANGE UP (ref 3.5–5.3)
RBC # BLD: 4.11 M/UL — SIGNIFICANT CHANGE UP (ref 3.8–5.2)
RBC # FLD: 14.2 % — SIGNIFICANT CHANGE UP (ref 10.3–14.5)
SODIUM SERPL-SCNC: 139 MMOL/L — SIGNIFICANT CHANGE UP (ref 135–145)
SPECIMEN SOURCE: SIGNIFICANT CHANGE UP
VANCOMYCIN TROUGH SERPL-MCNC: 7 UG/ML — LOW (ref 10–20)
WBC # BLD: 8.32 K/UL — SIGNIFICANT CHANGE UP (ref 3.8–10.5)
WBC # FLD AUTO: 8.32 K/UL — SIGNIFICANT CHANGE UP (ref 3.8–10.5)

## 2018-10-30 PROCEDURE — 71045 X-RAY EXAM CHEST 1 VIEW: CPT | Mod: 26

## 2018-10-30 PROCEDURE — 99232 SBSQ HOSP IP/OBS MODERATE 35: CPT

## 2018-10-30 PROCEDURE — 99233 SBSQ HOSP IP/OBS HIGH 50: CPT

## 2018-10-30 RX ORDER — DOCOSANOL 100 MG/G
1 CREAM TOPICAL
Qty: 1 | Refills: 0 | OUTPATIENT
Start: 2018-10-30 | End: 2018-11-01

## 2018-10-30 RX ORDER — VANCOMYCIN HCL 1 G
1 VIAL (EA) INTRAVENOUS
Qty: 108 | Refills: 0 | OUTPATIENT
Start: 2018-10-30 | End: 2018-12-22

## 2018-10-30 RX ORDER — DOCOSANOL 100 MG/G
1 CREAM TOPICAL
Qty: 1 | Refills: 0 | OUTPATIENT
Start: 2018-10-30 | End: 2018-11-13

## 2018-10-30 RX ORDER — CEFTRIAXONE 500 MG/1
2 INJECTION, POWDER, FOR SOLUTION INTRAMUSCULAR; INTRAVENOUS
Qty: 54 | Refills: 0 | OUTPATIENT
Start: 2018-10-30 | End: 2018-12-22

## 2018-10-30 RX ORDER — ENOXAPARIN SODIUM 100 MG/ML
40 INJECTION SUBCUTANEOUS DAILY
Qty: 0 | Refills: 0 | Status: DISCONTINUED | OUTPATIENT
Start: 2018-10-31 | End: 2018-10-31

## 2018-10-30 RX ADMIN — DOCOSANOL 1 APPLICATION(S): 100 CREAM TOPICAL at 21:49

## 2018-10-30 RX ADMIN — LOSARTAN POTASSIUM 50 MILLIGRAM(S): 100 TABLET, FILM COATED ORAL at 06:00

## 2018-10-30 RX ADMIN — DOCOSANOL 1 APPLICATION(S): 100 CREAM TOPICAL at 12:00

## 2018-10-30 RX ADMIN — Medication 250 MILLIGRAM(S): at 18:51

## 2018-10-30 RX ADMIN — Medication 250 MILLIGRAM(S): at 06:00

## 2018-10-30 RX ADMIN — Medication 650 MILLIGRAM(S): at 01:16

## 2018-10-30 RX ADMIN — DOCOSANOL 1 APPLICATION(S): 100 CREAM TOPICAL at 09:00

## 2018-10-30 RX ADMIN — Medication 650 MILLIGRAM(S): at 00:46

## 2018-10-30 RX ADMIN — DOCOSANOL 1 APPLICATION(S): 100 CREAM TOPICAL at 16:23

## 2018-10-30 RX ADMIN — CEFTRIAXONE 100 GRAM(S): 500 INJECTION, POWDER, FOR SOLUTION INTRAMUSCULAR; INTRAVENOUS at 21:48

## 2018-10-30 NOTE — PROGRESS NOTE ADULT - NEUROLOGICAL DETAILS
alert and oriented x 3/responds to verbal commands

## 2018-10-30 NOTE — PROGRESS NOTE ADULT - RS GEN PE MLT RESP DETAILS PC
clear to auscultation bilaterally/respirations non-labored/good air movement
clear to auscultation bilaterally/respirations non-labored
respirations non-labored/clear to auscultation bilaterally

## 2018-10-30 NOTE — DISCHARGE NOTE ADULT - PLAN OF CARE
ongoing management Back pain related to discitis/osteomyelitis at L1-L2  Follow up results of Biopsy as outpatient   Follow up with ID within 1-2 week after discharge Continue Antibiotic Vancomycin 1 gm q 12hours and Ceftriaxone 2 gm IVSS daily   Weekly blood work CBC, BMP, ESR, CRP Low salt diet  Activity as tolerated.  Take all medication as prescribed.  Follow up with your medical doctor for routine blood pressure monitoring at your next visit.  Notify your doctor if you have any of the following symptoms:   Dizziness, Lightheadedness, Blurry vision, Headache, Chest pain, Shortness of breath continue Vancomycin and Ceftriaxone

## 2018-10-30 NOTE — DISCHARGE NOTE ADULT - CARE PLAN
Principal Discharge DX:	Back pain, unspecified back location, unspecified back pain laterality, unspecified chronicity  Secondary Diagnosis:	Other acute osteomyelitis  Secondary Diagnosis:	Essential hypertension  Secondary Diagnosis:	Psoas abscess Principal Discharge DX:	Back pain, unspecified back location, unspecified back pain laterality, unspecified chronicity  Goal:	ongoing management  Assessment and plan of treatment:	Back pain related to discitis/osteomyelitis at L1-L2  Follow up results of Biopsy as outpatient   Follow up with ID within 1-2 week after discharge  Secondary Diagnosis:	Other acute osteomyelitis  Assessment and plan of treatment:	Continue Antibiotic Vancomycin 1 gm q 12hours and Ceftriaxone 2 gm IVSS daily   Weekly blood work CBC, BMP, ESR, CRP  Secondary Diagnosis:	Essential hypertension  Assessment and plan of treatment:	Low salt diet  Activity as tolerated.  Take all medication as prescribed.  Follow up with your medical doctor for routine blood pressure monitoring at your next visit.  Notify your doctor if you have any of the following symptoms:   Dizziness, Lightheadedness, Blurry vision, Headache, Chest pain, Shortness of breath  Secondary Diagnosis:	Psoas abscess  Assessment and plan of treatment:	continue Vancomycin and Ceftriaxone

## 2018-10-30 NOTE — CONSULT NOTE ADULT - SUBJECTIVE AND OBJECTIVE BOX
SUBJECTIVE:  48yFemale admitted with back pain (initially caused by trauma - fell in a shower), found to have spinal osteomyelitis vs. multiple myeloma, s/p biopsy.  An infectious process is favored and patient is on antibiotics, with plans for long term home Abx via PICC.  She has a long standing history of ulcerative colitis, well controlled on Remicade (dose increased by 50% in 7/2018 due to low trough level).  She has not had a flare up for years and her last colonoscopy in 2017 showed quiescent disease both endoscopically and histologically.  She has not been on steroids for years.  Reports 1-2 formed stools daily without urgency, bleeding or mucus.  denies abdominal pain.  no recent extra-intestinal manifestations of IBD.  ______________________________________________________________________  PMH/PSH:  PAST MEDICAL & SURGICAL HISTORY:  HTN (hypertension)  Depression  Ulcerative colitis  Encounter for Essure implantation    ______________________________________________________________________  MEDS:  MEDICATIONS  (STANDING):  cefTRIAXone   IVPB 2 Gram(s) IV Intermittent every 24 hours  docosanol 10% Cream 1 Application(s) Topical five times a day  influenza   Vaccine 0.5 milliLiter(s) IntraMuscular once  Levomilnacipran (Fetzima) 40 milliGRAM(s) 40 milliGRAM(s) Oral daily  losartan 50 milliGRAM(s) Oral daily  vancomycin  IVPB 1000 milliGRAM(s) IV Intermittent every 12 hours    MEDICATIONS  (PRN):    ______________________________________________________________________  ALL:   Allergies    No Known Allergies    Intolerances      ______________________________________________________________________  SH: no active toxic habits  ______________________________________________________________________  FH:  FAMILY HISTORY:  Family history of COPD (chronic obstructive pulmonary disease) (Father)    ______________________________________________________________________  ROS:    CONSTITUTIONAL:  No weight loss, fever, chills, weakness or fatigue.    HEENT:  Eyes:  No visual loss, blurred vision, double vision or yellow sclerae. Ears, Nose, Throat:  No hearing loss, sneezing, congestion, runny nose or sore throat.    SKIN:  No rash or itching.    CARDIOVASCULAR:  No chest pain, chest pressure or chest discomfort. No palpitations or edema.    RESPIRATORY:  No shortness of breath, cough or sputum.    GASTROINTESTINAL:  SEE HPI    GENITOURINARY:  No dysuria, hematuria, urinary frequency    NEUROLOGICAL:  No headache, dizziness, syncope, paralysis, ataxia, numbness or tingling in the extremities. No change in bowel or bladder control.    MUSCULOSKELETAL:  No muscle, back pain, joint pain or stiffness.    HEMATOLOGIC:  No anemia, bleeding or bruising.    LYMPHATICS:  No enlarged nodes. No history of splenectomy.    PSYCHIATRIC:  No history of depression or anxiety.    ENDOCRINOLOGIC:  No reports of sweating, cold or heat intolerance. No polyuria or polydipsia.    ALLERGIES:  No history of asthma, hives, eczema or rhinitis.  ______________________________________________________________________  PHYSICAL EXAM:  T(C): 36.6 (10-30-18 @ 04:53), Max: 36.6 (10-29-18 @ 19:51)  HR: 66 (10-30-18 @ 04:53)  BP: 108/71 (10-30-18 @ 04:53)  RR: 18 (10-30-18 @ 04:53)  SpO2: 99% (10-30-18 @ 04:53)  Wt(kg): --    10-29  -  10-30  --------------------------------------------------------  IN:    Oral Fluid: 800 mL  Total IN: 800 mL    OUT:  Total OUT: 0 mL    Total NET: 800 mL        PHYSICAL EXAM:      Constitutional: NAD    Back: +midline tenderness over biopsy site    Respiratory: CTA    Cardiovascular: RR    Gastrointestinal: soft NTND +BS no r/g/hsm    Psychiatric: A&O x 3    ______________________________________________________________________  LABS:                        11.7   8.32  )-----------( 369      ( 30 Oct 2018 08:01 )             37.1     10-30    139  |  103  |  11  ----------------------------<  104<H>  4.2   |  26  |  0.54    Ca    9.6      30 Oct 2018 06:34        ______________________________________________________________________  IMAGING:  EXAM:  MR SPINE LUMBAR IC                        PROCEDURE DATE:  10/25/2018        INTERPRETATION:  MRI of the lumbar spine with contrast    CLINICAL INDICATION:  Osteomyelitis    TECHNIQUE: Multiplanar, multisequence MR images of the lumbar spine were obtained before and after the intravenous administration of 6 cc of Gadavist. 1.5 cc were discarded.    COMPARISON: MRI lumbar spine 10/11/2018    FINDINGS:    Redemonstration of edema within the L1 and L2 vertebral bodies. New edema within the L1-L2 intervertebral disc. New small erosions involving the inferior endplate of L1 and superior endplate of L2. Abnormal enhancement   within the L1 and L2 vertebral bodies and endplates adjoining the L1-L2 disc space. Increased abnormal prevertebral and paravertebral soft tissue at the L1-L2 level. Probable microabscesses in the psoas muscles.    Remaining vertebral bodies demonstrate normal marrow signal homogeneity. Vertebral body height and alignment are maintained. Disc space narrowing at L1-L2. The conus is normal in size, position, and signal characteristics, ending at L1.    T12-L1: No spinal canal stenosis or neural foraminal narrowing.    L1-L2: No spinal canal stenosis or neural foraminal narrowing.    L2-L3: No spinal canal stenosis or neural foraminal narrowing.    L3-L4: No spinal canal stenosis or neural foraminal narrowing.    L4-L5: Minimal broad-based disc protrusion. No spinal canal stenosis or neural foraminal narrowing.    L5-S1: Mild bilateral facet hypertrophy. No spinal canal stenosis or neural foraminal narrowing.    ______________________________________________________________________  ASSESSMENT:  48y Female with vertebral osteomyelitis (less likely multiple myeloma, bx pending) now in need of long term IV Abx for source control/treatment.  Patient on Remicade for severe ulcerative colitis which has been clinically, endoscopically and histologically quiescent for > 1 year.  At this time continuing Remicade is likely contra-indicated (risks of worsening infections >> chance of colitis flare).      PLAN:  1.  IV Abx per ID  2.  Once cleared by ID will resume Remicade  3.  If flares will need to re-assess risks vs. benefits of resuming TNFa inhibitor    Terry Guadalupe M.D.  [facility]  (O) [phone]

## 2018-10-30 NOTE — DISCHARGE NOTE ADULT - MEDICATION SUMMARY - MEDICATIONS TO TAKE
I will START or STAY ON the medications listed below when I get home from the hospital:    Weekly Blood work for: CBC, ESR, CPR, BMP  -- once a week on Wednesday   Fax results to Dr. Steven Campos   [phone] (Fax)  -- Indication: For Blood work while on ABx     losartan  -- 50 milligram(s) by mouth once a day  -- Indication: For Essential hypertension    Fetzima 40 mg oral capsule, extended release  -- 1 cap(s) by mouth once a day  -- Indication: For Depression    cefTRIAXone 2 g injection  -- 2 gram(s) intravenously once a day   -- Indication: For Osteomyelitis    vancomycin 1 g intravenous injection  -- 1 gram(s) intravenously every 12 hours   -- Indication: For Osteomyelitis    Remicade  -- intravenous every 8 weeks  -- Indication: For Ulcerative colitis I will START or STAY ON the medications listed below when I get home from the hospital:    Weekly Blood work for: CBC, ESR, CRP, BMP  -- every 7 days   Fax report to Dr. Steven Campos  [phone]  -- Indication: For Blood work     losartan  -- 50 milligram(s) by mouth once a day  -- Indication: For Essential hypertension    Fetzima 40 mg oral capsule, extended release  -- 1 cap(s) by mouth once a day  -- Indication: For Depression    cefTRIAXone 2 g injection  -- 2 gram(s) intravenously once a day   -- Indication: For Other acute osteomyelitis    vancomycin 1 g intravenous injection  -- 1 gram(s) intravenously every 12 hours   -- Indication: For Other acute osteomyelitis, other site    Remicade  -- intravenous every 8 weeks  -- Indication: For Ulcerative colitis I will START or STAY ON the medications listed below when I get home from the hospital:    Weekly Blood work for: CBC, ESR, CRP, BMP  -- every 7 days   Fax report to Dr. Steven Campos  [phone]  -- Indication: For Blood work     losartan  -- 50 milligram(s) by mouth once a day  -- Indication: For Essential hypertension    Fetzima 40 mg oral capsule, extended release  -- 1 cap(s) by mouth once a day  -- Indication: For Depression    cefTRIAXone 2 g injection  -- 2 gram(s) intravenously once a day   -- Indication: For Other acute osteomyelitis    vancomycin 1 g intravenous injection  -- 1 gram(s) intravenously every 12 hours   -- Indication: For Other acute osteomyelitis, other site    Remicade  -- intravenous every 8 weeks  -- Indication: For Ulcerative colitis I will START or STAY ON the medications listed below when I get home from the hospital:    Weekly Blood work for: CBC, ESR, CRP, BMP  -- every 7 days   Fax report to Dr. Steven Campos  [phone]  -- Indication: For Blood work     losartan  -- 50 milligram(s) by mouth once a day  -- Indication: For Essential hypertension    Fetzima 40 mg oral capsule, extended release  -- 1 cap(s) by mouth once a day  -- Indication: For Depression    cefTRIAXone 2 g injection  -- 2 gram(s) intravenously once a day   -- Indication: For Other acute osteomyelitis    Remicade  -- intravenous every 8 weeks  -- Indication: For Ulcerative colitis    DAPTOmycin 500 mg intravenous injection  -- 350 milligram(s) intravenous every 24 hours  -- Indication: For acute osteomyelitis I will START or STAY ON the medications listed below when I get home from the hospital:    Weekly Blood work for: CBC, ESR, CRP, BMP  -- every 7 days   Fax report to Dr. Steven Campos  [phone]  -- Indication: For Blood work     losartan  -- 50 milligram(s) by mouth once a day  -- Indication: For Essential hypertension    Fetzima 40 mg oral capsule, extended release  -- 1 cap(s) by mouth once a day  -- Indication: For Depression    cefTRIAXone 2 g injection  -- 2 gram(s) intravenously once a day   -- Indication: For Other acute osteomyelitis    Remicade  -- intravenous every 8 weeks  -- Indication: For Ulcerative colitis    DAPTOmycin 350 mg intravenous injection  -- 350 milligram(s) intravenous in 50 mL sodium chloride infuse daily  -- Keep in refrigerator.  Do not freeze.    -- Indication: For Other acute osteomyelitis

## 2018-10-30 NOTE — PROGRESS NOTE ADULT - PROBLEM SELECTOR PLAN 1
-MRI spine reveals findings consistent with progressive discitis/osteomyelitis at L1-L2, with prevertebral phlegmon and probable microabscesses in the psoas   muscles  -could be from recent Trigger point injection  -No indication of sepsis at this time  -Blood cx NGTD  -S/P  guided biopsy L1-2 done  10/29/18 with no bacteria on gram stain / pos PMNS   -Neurosurgery on board  - F/UP  fluid cx for bacterial, fungal , AFB + path form Biopsy specimen   -C/W  vancomycin 1 gm iv q12 + Ceftriaxone 2 gm iv q24  ( start 10/29/18)   -PICC on 10/30   - F/UP Vancomycin through pre 4th dose   - will need to monitor renal function as well  Once arrangement for outpatient IV ABX is done , patient will be d/c home and will schedule f/up visit with ID in the clinic and CX will be f/up and antibiotics will be adjusted then.

## 2018-10-30 NOTE — PROGRESS NOTE ADULT - GASTROINTESTINAL DETAILS
no rebound tenderness/no rigidity/nontender/no distention/no guarding/soft
soft/no rebound tenderness/no distention/no guarding/nontender
soft/no rebound tenderness/no guarding/nontender/no distention

## 2018-10-30 NOTE — PROGRESS NOTE ADULT - SUBJECTIVE AND OBJECTIVE BOX
Patient is a 48y old  Female who presents with a chief complaint of Osteomyelitis vs MM (30 Oct 2018 10:52)      INTERVAL HPI/OVERNIGHT EVENTS:  Patient is s/p CT guided Biopsy done on 10/29 with no reported complications .        Medications:MEDICATIONS  (STANDING):  cefTRIAXone   IVPB 2 Gram(s) IV Intermittent every 24 hours  docosanol 10% Cream 1 Application(s) Topical five times a day  influenza   Vaccine 0.5 milliLiter(s) IntraMuscular once  Levomilnacipran (Fetzima) 40 milliGRAM(s) 40 milliGRAM(s) Oral daily  losartan 50 milliGRAM(s) Oral daily  vancomycin  IVPB 1000 milliGRAM(s) IV Intermittent every 12 hours    MEDICATIONS  (PRN):      Allergies: Allergies    No Known Allergies        REVIEW OF SYSTEMS:  CONSTITUTIONAL: No fever  NECK: No pain or stiffness  RESPIRATORY: No cough, wheezing, chills or hemoptysis; No shortness of breath  CARDIOVASCULAR: No chest pain, palpitations, dizziness, or leg swelling  GASTROINTESTINAL: No abdominal or epigastric pain. No nausea, vomiting, or hematemesis; No diarrhea or constipation. No melena or hematochezia.  GENITOURINARY: No dysuria  NEUROLOGICAL: No headaches  SKIN: No itching, burning, rashes, or lesions   PSYCHIATRIC: No depression    T(C): 36.5 (10-30-18 @ 12:08), Max: 36.6 (10-29-18 @ 19:51)  HR: 84 (10-30-18 @ 12:08) (66 - 88)  BP: 115/76 (10-30-18 @ 12:08) (108/71 - 115/76)  RR: 18 (10-30-18 @ 12:08) (18 - 18)  SpO2: 98% (10-30-18 @ 12:08) (98% - 100%)  Wt(kg): --Vital Signs Last 24 Hrs  T(C): 36.5 (30 Oct 2018 12:08), Max: 36.6 (29 Oct 2018 19:51)  T(F): 97.7 (30 Oct 2018 12:08), Max: 97.9 (29 Oct 2018 19:51)  HR: 84 (30 Oct 2018 12:08) (66 - 88)  BP: 115/76 (30 Oct 2018 12:08) (108/71 - 115/76)  BP(mean): --  RR: 18 (30 Oct 2018 12:08) (18 - 18)  SpO2: 98% (30 Oct 2018 12:08) (98% - 100%)  I&O's Summary    29 Oct 2018 07:01  -  30 Oct 2018 07:00  --------------------------------------------------------  IN: 800 mL / OUT: 0 mL / NET: 800 mL    30 Oct 2018 07:01  -  30 Oct 2018 17:26  --------------------------------------------------------  IN: 480 mL / OUT: 0 mL / NET: 480 mL    PHYSICAL EXAM:  GENERAL: NAD, well-groomed, well-developed  HEAD:  Atraumatic, Normocephalic  NECK: Supple, No JVD, Normal thyroid  NERVOUS SYSTEM:  Alert & Oriented X3, Good concentration  CHEST/LUNG: Clear to percussion bilaterally; No rales, rhonchi, wheezing, or rubs  HEART: Regular rate and rhythm; No murmurs, rubs, or gallops  ABDOMEN: Soft, Nontender, Nondistended; Bowel sounds present  EXTREMITIES:  2+ Peripheral Pulses, No clubbing, cyanosis, or edema  Spine : with no erythema noted / bandage on the back is noted         Consultant(s) Notes Reviewed:  [x ] YES  [ ] NO  Care Discussed with Consultants/Other Providers [ x] YES  [ ] NO  Name of Consultant  LABS:                        11.7   8.32  )-----------( 369      ( 30 Oct 2018 08:01 )             37.1     10-30    139  |  103  |  11  ----------------------------<  104<H>  4.2   |  26  |  0.54    Ca    9.6      30 Oct 2018 06:34      PT/INR - ( 29 Oct 2018 09:29 )   PT: 13.9 sec;   INR: 1.23 ratio         PTT - ( 29 Oct 2018 09:29 )  PTT:41.6 sec    CAPILLARY BLOOD GLUCOSE                RADIOLOGY & ADDITIONAL TESTS:  EKG :     Imaging Personally Reviewed:  [ ] YES  [ ] NO  HEALTH ISSUES - PROBLEM Dx:  Medication monitoring encounter: Medication monitoring encounter  Long term (current) use of antibiotics: Long term (current) use of antibiotics  Other acute osteomyelitis, other site: Other acute osteomyelitis, other site  Elevated C-reactive protein: Elevated C-reactive protein  Elevated erythrocyte sedimentation rate: Elevated erythrocyte sedimentation rate  Back pain: Back pain  Psoas abscess: Psoas abscess  Vertebral osteomyelitis: Vertebral osteomyelitis  Vertebral anomaly: Vertebral anomaly  Depression: Depression  Prophylactic measure: Prophylactic measure  HTN (hypertension): HTN (hypertension)  Ulcerative colitis: Ulcerative colitis

## 2018-10-30 NOTE — DISCHARGE NOTE ADULT - ADDITIONAL INSTRUCTIONS
1. Please call to schedule an appointment with Dr. Campos within 1-2 week after discharge to f/u Biopsy result and possible adjustment of ABx   2. Please call to schedule an appointment with your PMD within 2 weeks after discharge  3. Please call to schedule an appointment with your GI doctor within 2-3 weeks

## 2018-10-30 NOTE — PROGRESS NOTE ADULT - SUBJECTIVE AND OBJECTIVE BOX
ANANYA TOVAR 48y [id number]    Patient is a 48y old  Female who presents with a chief complaint of Osteomyelitis vs MM (30 Oct 2018 08:43)      Follow Up/CC:  ID following for psoas abscess    Interval History/ROS: back pain controlled, s/p biopsy     Allergies    No Known Allergies    Intolerances        ANTIMICROBIALS:  cefTRIAXone   IVPB 2 every 24 hours  vancomycin  IVPB 1000 every 12 hours      MEDICATIONS  (STANDING):  cefTRIAXone   IVPB 2 Gram(s) IV Intermittent every 24 hours  docosanol 10% Cream 1 Application(s) Topical five times a day  influenza   Vaccine 0.5 milliLiter(s) IntraMuscular once  Levomilnacipran (Fetzima) 40 milliGRAM(s) 40 milliGRAM(s) Oral daily  losartan 50 milliGRAM(s) Oral daily  vancomycin  IVPB 1000 milliGRAM(s) IV Intermittent every 12 hours    MEDICATIONS  (PRN):        Vital Signs Last 24 Hrs  T(C): 36.6 (30 Oct 2018 04:53), Max: 36.6 (29 Oct 2018 19:51)  T(F): 97.9 (30 Oct 2018 04:53), Max: 97.9 (29 Oct 2018 19:51)  HR: 66 (30 Oct 2018 04:53) (66 - 88)  BP: 108/71 (30 Oct 2018 04:53) (108/71 - 113/74)  BP(mean): --  RR: 18 (30 Oct 2018 04:53) (18 - 18)  SpO2: 99% (30 Oct 2018 04:53) (99% - 100%)    CBC Full  -  ( 30 Oct 2018 08:01 )  WBC Count : 8.32 K/uL  Hemoglobin : 11.7 g/dL  Hematocrit : 37.1 %  Platelet Count - Automated : 369 K/uL  Mean Cell Volume : 90.3 fl  Mean Cell Hemoglobin : 28.5 pg  Mean Cell Hemoglobin Concentration : 31.5 gm/dL  Auto Neutrophil # : x  Auto Lymphocyte # : x  Auto Monocyte # : x  Auto Eosinophil # : x  Auto Basophil # : x  Auto Neutrophil % : x  Auto Lymphocyte % : x  Auto Monocyte % : x  Auto Eosinophil % : x  Auto Basophil % : x    10-30    139  |  103  |  11  ----------------------------<  104<H>  4.2   |  26  |  0.54    Ca    9.6      30 Oct 2018 06:34            MICROBIOLOGY:  .Body Fluid Synovial Fluid  10-29-18   Testing in progress  --    polymorphonuclear leukocytes seen per low power field  No organisms seen per oil power field by cytocentrifuge      .Urine Clean Catch (Midstream)  10-25-18   No growth  --  --      .Blood Blood-Venous  10-25-18   No growth at 5 days.  --  --      Rapid RVP Result: NotDetec (10-24 @ 21:10)        RADIOLOGY    CT Biopsy Fine Needle Aspirate (10.29.18 @ 16:42) >  CT needle guided aspiration of a paraspinal phlegmonous   collection at the L1-2 level suspicious for osteomyelitis and discitis.

## 2018-10-30 NOTE — DISCHARGE NOTE ADULT - CARE PROVIDER_API CALL
Steven Campos; MBBS), Infectious Disease; Internal Medicine  [address] [place] [address]  Phone: [phone]  Fax: [phone]

## 2018-10-30 NOTE — DISCHARGE NOTE ADULT - HOSPITAL COURSE
to be done 48 year old female PMH HTN, UC (on Remicaide), depression sent in from neurosurgeon's office (Dr. Usman Craig) for further evaluation of spinal lesion on PET scan, findings concerning for osteomyelitis vs. MM with MRI Spine findings consistent with L1-L2 Osteomyelitis/discitis/phlegmon.  Patient is s/p CT guided Biopsy done on 10/29/18. Started on vancomycin 1 gm iv q12 + Ceftriaxone 2 gm iv q24 will need IV antibiotics for a total of 8 weeks.  PICC placed and home antibiotics arranged by case management. Will follow up with Dr Campos in 6 weeks. Patient stable for discharge to home. 48 year old female PMH HTN, UC (on Remicaide), depression sent in from neurosurgeon's office (Dr. Usman Craig) for further evaluation of spinal lesion on PET scan, findings concerning for osteomyelitis vs. MM with MRI Spine findings consistent with L1-L2 Osteomyelitis/discitis/phlegmon.  Patient is s/p CT guided Biopsy done on 10/29/18. Started on vancomycin 1 gm iv q12 + Ceftriaxone 2 gm iv q24 will need IV antibiotics for a total of 8 weeks.  Gram stain from the Biopsy specimen with no bacteria but with pos PMNS.  PICC placed and home antibiotics arranged by case management. Will follow up with Dr Campos in 6 weeks.  Patient is to have CBC/ CMP weekly and Vancomycin through level as directed by ID.  Vancomycin through levels was done Before the 3rd dose instead of before the 4th dose and was found to be 7.0 / ID was called and will discuss with the infusion company.  Hemoglobin today =10.9 and her hemoglobin as outpatient is around 11-13.  Patient states that in september 2018, she was told that her hemoglobin was low but does not recall the number / since patient is on her Menses ( LMP 10/29), patient is to have her PCP / Dr Trotter to repeat CBC On 11/5 or 11/6 the latest and patient denies any bleed.  Side effects of the antibiotics are extensively explained to patient.     Patient stable for discharge to home.

## 2018-10-30 NOTE — DISCHARGE NOTE ADULT - PATIENT PORTAL LINK FT
You can access the [facility] Patient Portal, offered by [facility], by registering with the following website: [email or web address]

## 2018-10-30 NOTE — PROGRESS NOTE ADULT - PROBLEM SELECTOR PLAN 1
-cultures negative so far  -will need a PICC + long term IV abx next week for treatment  -s/p biopsy - f/u cx   -PICC  -f/u in ID office in 6-7 weeks for followup [phone]

## 2018-10-31 VITALS
RESPIRATION RATE: 18 BRPM | TEMPERATURE: 98 F | HEART RATE: 100 BPM | SYSTOLIC BLOOD PRESSURE: 116 MMHG | DIASTOLIC BLOOD PRESSURE: 81 MMHG | OXYGEN SATURATION: 100 %

## 2018-10-31 DIAGNOSIS — D64.9 ANEMIA, UNSPECIFIED: ICD-10-CM

## 2018-10-31 LAB
ANION GAP SERPL CALC-SCNC: 10 MMOL/L — SIGNIFICANT CHANGE UP (ref 5–17)
BUN SERPL-MCNC: 10 MG/DL — SIGNIFICANT CHANGE UP (ref 7–23)
CALCIUM SERPL-MCNC: 9.3 MG/DL — SIGNIFICANT CHANGE UP (ref 8.4–10.5)
CHLORIDE SERPL-SCNC: 104 MMOL/L — SIGNIFICANT CHANGE UP (ref 96–108)
CO2 SERPL-SCNC: 25 MMOL/L — SIGNIFICANT CHANGE UP (ref 22–31)
CREAT SERPL-MCNC: 0.54 MG/DL — SIGNIFICANT CHANGE UP (ref 0.5–1.3)
GLUCOSE SERPL-MCNC: 95 MG/DL — SIGNIFICANT CHANGE UP (ref 70–99)
HCT VFR BLD CALC: 33.1 % — LOW (ref 34.5–45)
HGB BLD-MCNC: 10.9 G/DL — LOW (ref 11.5–15.5)
MCHC RBC-ENTMCNC: 28.9 PG — SIGNIFICANT CHANGE UP (ref 27–34)
MCHC RBC-ENTMCNC: 32.9 GM/DL — SIGNIFICANT CHANGE UP (ref 32–36)
MCV RBC AUTO: 87.8 FL — SIGNIFICANT CHANGE UP (ref 80–100)
PLATELET # BLD AUTO: 308 K/UL — SIGNIFICANT CHANGE UP (ref 150–400)
POTASSIUM SERPL-MCNC: 3.9 MMOL/L — SIGNIFICANT CHANGE UP (ref 3.5–5.3)
POTASSIUM SERPL-SCNC: 3.9 MMOL/L — SIGNIFICANT CHANGE UP (ref 3.5–5.3)
RBC # BLD: 3.77 M/UL — LOW (ref 3.8–5.2)
RBC # FLD: 14.3 % — SIGNIFICANT CHANGE UP (ref 10.3–14.5)
SODIUM SERPL-SCNC: 139 MMOL/L — SIGNIFICANT CHANGE UP (ref 135–145)
WBC # BLD: 6.07 K/UL — SIGNIFICANT CHANGE UP (ref 3.8–10.5)
WBC # FLD AUTO: 6.07 K/UL — SIGNIFICANT CHANGE UP (ref 3.8–10.5)

## 2018-10-31 PROCEDURE — 96374 THER/PROPH/DIAG INJ IV PUSH: CPT

## 2018-10-31 PROCEDURE — 84295 ASSAY OF SERUM SODIUM: CPT

## 2018-10-31 PROCEDURE — 87581 M.PNEUMON DNA AMP PROBE: CPT

## 2018-10-31 PROCEDURE — 87486 CHLMYD PNEUM DNA AMP PROBE: CPT

## 2018-10-31 PROCEDURE — 82803 BLOOD GASES ANY COMBINATION: CPT

## 2018-10-31 PROCEDURE — 83605 ASSAY OF LACTIC ACID: CPT

## 2018-10-31 PROCEDURE — 81001 URINALYSIS AUTO W/SCOPE: CPT

## 2018-10-31 PROCEDURE — 87633 RESP VIRUS 12-25 TARGETS: CPT

## 2018-10-31 PROCEDURE — 85610 PROTHROMBIN TIME: CPT

## 2018-10-31 PROCEDURE — 71045 X-RAY EXAM CHEST 1 VIEW: CPT

## 2018-10-31 PROCEDURE — 99239 HOSP IP/OBS DSCHRG MGMT >30: CPT

## 2018-10-31 PROCEDURE — 99285 EMERGENCY DEPT VISIT HI MDM: CPT | Mod: 25

## 2018-10-31 PROCEDURE — 93005 ELECTROCARDIOGRAM TRACING: CPT

## 2018-10-31 PROCEDURE — 80053 COMPREHEN METABOLIC PANEL: CPT

## 2018-10-31 PROCEDURE — 84166 PROTEIN E-PHORESIS/URINE/CSF: CPT

## 2018-10-31 PROCEDURE — 85027 COMPLETE CBC AUTOMATED: CPT

## 2018-10-31 PROCEDURE — 87116 MYCOBACTERIA CULTURE: CPT

## 2018-10-31 PROCEDURE — 83615 LACTATE (LD) (LDH) ENZYME: CPT

## 2018-10-31 PROCEDURE — 87206 SMEAR FLUORESCENT/ACID STAI: CPT

## 2018-10-31 PROCEDURE — 87086 URINE CULTURE/COLONY COUNT: CPT

## 2018-10-31 PROCEDURE — A9585: CPT

## 2018-10-31 PROCEDURE — 36569 INSJ PICC 5 YR+ W/O IMAGING: CPT

## 2018-10-31 PROCEDURE — 83521 IG LIGHT CHAINS FREE EACH: CPT

## 2018-10-31 PROCEDURE — 82435 ASSAY OF BLOOD CHLORIDE: CPT

## 2018-10-31 PROCEDURE — 87040 BLOOD CULTURE FOR BACTERIA: CPT

## 2018-10-31 PROCEDURE — 85730 THROMBOPLASTIN TIME PARTIAL: CPT

## 2018-10-31 PROCEDURE — C1751: CPT

## 2018-10-31 PROCEDURE — 82232 ASSAY OF BETA-2 PROTEIN: CPT

## 2018-10-31 PROCEDURE — 87070 CULTURE OTHR SPECIMN AEROBIC: CPT

## 2018-10-31 PROCEDURE — 84702 CHORIONIC GONADOTROPIN TEST: CPT

## 2018-10-31 PROCEDURE — 87205 SMEAR GRAM STAIN: CPT

## 2018-10-31 PROCEDURE — 87798 DETECT AGENT NOS DNA AMP: CPT

## 2018-10-31 PROCEDURE — 80048 BASIC METABOLIC PNL TOTAL CA: CPT

## 2018-10-31 PROCEDURE — 87015 SPECIMEN INFECT AGNT CONCNTJ: CPT

## 2018-10-31 PROCEDURE — 77012 CT SCAN FOR NEEDLE BIOPSY: CPT

## 2018-10-31 PROCEDURE — 85652 RBC SED RATE AUTOMATED: CPT

## 2018-10-31 PROCEDURE — 86140 C-REACTIVE PROTEIN: CPT

## 2018-10-31 PROCEDURE — 82330 ASSAY OF CALCIUM: CPT

## 2018-10-31 PROCEDURE — 80202 ASSAY OF VANCOMYCIN: CPT

## 2018-10-31 PROCEDURE — 85014 HEMATOCRIT: CPT

## 2018-10-31 PROCEDURE — 86334 IMMUNOFIX E-PHORESIS SERUM: CPT

## 2018-10-31 PROCEDURE — 84165 PROTEIN E-PHORESIS SERUM: CPT

## 2018-10-31 PROCEDURE — 87075 CULTR BACTERIA EXCEPT BLOOD: CPT

## 2018-10-31 PROCEDURE — 84132 ASSAY OF SERUM POTASSIUM: CPT

## 2018-10-31 PROCEDURE — 87102 FUNGUS ISOLATION CULTURE: CPT

## 2018-10-31 PROCEDURE — 72149 MRI LUMBAR SPINE W/DYE: CPT

## 2018-10-31 PROCEDURE — 71046 X-RAY EXAM CHEST 2 VIEWS: CPT

## 2018-10-31 PROCEDURE — 84155 ASSAY OF PROTEIN SERUM: CPT

## 2018-10-31 PROCEDURE — 99233 SBSQ HOSP IP/OBS HIGH 50: CPT

## 2018-10-31 PROCEDURE — 10022: CPT

## 2018-10-31 PROCEDURE — 82947 ASSAY GLUCOSE BLOOD QUANT: CPT

## 2018-10-31 RX ORDER — VANCOMYCIN HCL 1 G
1000 VIAL (EA) INTRAVENOUS EVERY 12 HOURS
Qty: 0 | Refills: 0 | Status: DISCONTINUED | OUTPATIENT
Start: 2018-10-31 | End: 2018-10-31

## 2018-10-31 RX ORDER — VANCOMYCIN HCL 1 G
1250 VIAL (EA) INTRAVENOUS EVERY 12 HOURS
Qty: 0 | Refills: 0 | Status: DISCONTINUED | OUTPATIENT
Start: 2018-10-31 | End: 2018-10-31

## 2018-10-31 RX ORDER — DAPTOMYCIN 500 MG/10ML
350 INJECTION, POWDER, LYOPHILIZED, FOR SOLUTION INTRAVENOUS EVERY 24 HOURS
Qty: 0 | Refills: 0 | Status: DISCONTINUED | OUTPATIENT
Start: 2018-10-31 | End: 2018-10-31

## 2018-10-31 RX ORDER — DAPTOMYCIN 500 MG/10ML
350 INJECTION, POWDER, LYOPHILIZED, FOR SOLUTION INTRAVENOUS
Qty: 1 | Refills: 0 | OUTPATIENT
Start: 2018-10-31 | End: 2018-12-11

## 2018-10-31 RX ORDER — VANCOMYCIN HCL 1 G
1250 VIAL (EA) INTRAVENOUS ONCE
Qty: 0 | Refills: 0 | Status: COMPLETED | OUTPATIENT
Start: 2018-10-31 | End: 2018-10-31

## 2018-10-31 RX ORDER — DAPTOMYCIN 500 MG/10ML
350 INJECTION, POWDER, LYOPHILIZED, FOR SOLUTION INTRAVENOUS
Qty: 1 | Refills: 0 | OUTPATIENT
Start: 2018-10-31

## 2018-10-31 RX ADMIN — LOSARTAN POTASSIUM 50 MILLIGRAM(S): 100 TABLET, FILM COATED ORAL at 05:28

## 2018-10-31 RX ADMIN — DOCOSANOL 1 APPLICATION(S): 100 CREAM TOPICAL at 08:20

## 2018-10-31 RX ADMIN — CEFTRIAXONE 100 GRAM(S): 500 INJECTION, POWDER, FOR SOLUTION INTRAMUSCULAR; INTRAVENOUS at 15:14

## 2018-10-31 RX ADMIN — DOCOSANOL 1 APPLICATION(S): 100 CREAM TOPICAL at 11:37

## 2018-10-31 RX ADMIN — DAPTOMYCIN 114 MILLIGRAM(S): 500 INJECTION, POWDER, LYOPHILIZED, FOR SOLUTION INTRAVENOUS at 14:22

## 2018-10-31 RX ADMIN — Medication 250 MILLIGRAM(S): at 06:39

## 2018-10-31 NOTE — PROGRESS NOTE ADULT - NEGATIVE OPHTHALMOLOGIC SYMPTOMS
no blurred vision L/no blurred vision R/no loss of vision L/no loss of vision R
no loss of vision L/no blurred vision L/no blurred vision R/no loss of vision R
no blurred vision R/no blurred vision L/no loss of vision R/no loss of vision L
no loss of vision R/no loss of vision L/no blurred vision L/no blurred vision R

## 2018-10-31 NOTE — PROGRESS NOTE ADULT - PROBLEM SELECTOR PROBLEM 4
Depression
HTN (hypertension)
Depression
Elevated erythrocyte sedimentation rate

## 2018-10-31 NOTE — PROGRESS NOTE ADULT - PROBLEM SELECTOR PLAN 2
-Chronic  -Continue Remicade.
-MRI findings for probable microabscess  -No indication of sepsis at this time  -Plan as above
-MRI findings for probable microabscess  -No indication of sepsis, systemic infection at this time  -Plan as above
-MRI findings for probable microabscess  -No indication of sepsis, systemic infection at this time  -Plan as above
-MRI findings for probable microabscess  -Plan as above
-MRI findings for probable microabscess  -Plan as above
-MRI findings for probable microabscess  -No indication of sepsis, systemic infection at this time  -Plan as above
-s/p CT guided biopsy today  -on vancomycin 1 gm iv q12 + Ceftriaxone 2 gm iv q24 empirically   -will adjust abx based on culture results  -PICC  -can leave once services and picc/abx setup for outpatient - cx will take 5 days to finalise, can f/u results as outpatient
-s/p CT guided biopsy today  -Ceftriaxone 2 gm iv q24 empirically   -will adjust abx based on culture results  -PICC  -can leave once services and picc/abx setup for outpatient - cx will take 5 days to finalise, can f/u results as outpatient  -will DC vancomycin due to issues on getting labs etc at home as pt is going back to work  -start daptomycin 350 mg iv q24, will add weekly CPK while on daptomycin
-for CT guided biopsy today  -check fluid cx for bacterial, fungal , AFB + path  -start vancomycin 1 gm iv q12 + Ceftriaxone 2 gm iv q24 empirically after biopsy today  -will adjust abx based on culture results  -PICC  -prob can leave once services and abx setup for outpatient - cx and path will take 5-7 days to finalise, can f/u results as outpatient
-for CT guided biopsy next week  -check fluid cx for bacterial, fungal , AFB  -can start IV vancomycin + Ceftriaxone post biopsy next week  -will adjust abx based on culture results

## 2018-10-31 NOTE — PROGRESS NOTE ADULT - PROBLEM SELECTOR PROBLEM 6
Prophylactic measure
Long term (current) use of antibiotics

## 2018-10-31 NOTE — PROGRESS NOTE ADULT - SUBJECTIVE AND OBJECTIVE BOX
ANANYA TOVAR 48y [id number]    Patient is a 48y old  Female who presents with a chief complaint of Osteomyelitis vs MM (31 Oct 2018 11:15)      Follow Up/CC:  ID following for spinal OM    Interval History/ROS: feels ok    Allergies    No Known Allergies    Intolerances        ANTIMICROBIALS:  cefTRIAXone   IVPB 2 every 24 hours  vancomycin  IVPB 1000 every 12 hours      MEDICATIONS  (STANDING):  cefTRIAXone   IVPB 2 Gram(s) IV Intermittent every 24 hours  docosanol 10% Cream 1 Application(s) Topical five times a day  enoxaparin Injectable 40 milliGRAM(s) SubCutaneous daily  influenza   Vaccine 0.5 milliLiter(s) IntraMuscular once  Levomilnacipran (Fetzima) 40 milliGRAM(s) 40 milliGRAM(s) Oral daily  losartan 50 milliGRAM(s) Oral daily  vancomycin  IVPB 1000 milliGRAM(s) IV Intermittent every 12 hours    MEDICATIONS  (PRN):        Vital Signs Last 24 Hrs  T(C): 36.6 (31 Oct 2018 11:47), Max: 36.7 (31 Oct 2018 04:49)  T(F): 97.9 (31 Oct 2018 11:47), Max: 98 (31 Oct 2018 04:49)  HR: 100 (31 Oct 2018 11:47) (78 - 100)  BP: 116/81 (31 Oct 2018 11:47) (98/60 - 116/81)  BP(mean): --  RR: 18 (31 Oct 2018 11:47) (18 - 18)  SpO2: 100% (31 Oct 2018 11:47) (98% - 100%)    CBC Full  -  ( 31 Oct 2018 07:56 )  WBC Count : 6.07 K/uL  Hemoglobin : 10.9 g/dL  Hematocrit : 33.1 %  Platelet Count - Automated : 308 K/uL  Mean Cell Volume : 87.8 fl  Mean Cell Hemoglobin : 28.9 pg  Mean Cell Hemoglobin Concentration : 32.9 gm/dL  Auto Neutrophil # : x  Auto Lymphocyte # : x  Auto Monocyte # : x  Auto Eosinophil # : x  Auto Basophil # : x  Auto Neutrophil % : x  Auto Lymphocyte % : x  Auto Monocyte % : x  Auto Eosinophil % : x  Auto Basophil % : x    10-31    139  |  104  |  10  ----------------------------<  95  3.9   |  25  |  0.54    Ca    9.3      31 Oct 2018 05:51            MICROBIOLOGY:  .Body Fluid Synovial Fluid  10-29-18   No growth to date.  --    polymorphonuclear leukocytes seen per low power field  No organisms seen per oil power field by cytocentrifuge      .Urine Clean Catch (Midstream)  10-25-18   No growth  --  --      .Blood Blood-Venous  10-25-18   No growth at 5 days.  --  --      Vancomycin Level, Trough: 7.0 ug/mL (10-30-18 @ 19:21)      Rapid RVP Result: NotDetec (10-24 @ 21:10)      RADIOLOGY    < from: Xray Chest 1 View- PORTABLE-Urgent (10.30.18 @ 13:41) >  Right PICC, with distal tip in SVC. No pneumothorax.

## 2018-10-31 NOTE — PROGRESS NOTE ADULT - PROBLEM SELECTOR PROBLEM 1
Other acute osteomyelitis, other site
Vertebral anomaly
Other acute osteomyelitis, other site
Vertebral osteomyelitis

## 2018-10-31 NOTE — PROGRESS NOTE ADULT - NEGATIVE CARDIOVASCULAR SYMPTOMS
no palpitations/no chest pain/no dyspnea on exertion
no chest pain/no palpitations/no dyspnea on exertion
no dyspnea on exertion/no palpitations/no chest pain
no chest pain/no dyspnea on exertion/no palpitations

## 2018-10-31 NOTE — CHART NOTE - NSCHARTNOTEFT_GEN_A_CORE
MEDICINE NP NOTE  Spectra [id number]      Case discussed with Dr Campos.  Will switch patient from vancomycin to daptomycin 350 mg IV daily.  Patient will receive first dose of daptomycin prior to discharge.  Will continue to monitor.

## 2018-10-31 NOTE — PROGRESS NOTE ADULT - PROBLEM SELECTOR PROBLEM 3
Depression
Ulcerative colitis
Back pain

## 2018-10-31 NOTE — PROGRESS NOTE ADULT - ATTENDING COMMENTS
Pt stable.  Monitor BCx.  If BCx negative, plan for CT guided biopsy L1-2 with anesthesia 10/29/18 2:30 PM. Please make pt NPO evening before procedure.
See Neurosurgery consult note.
Siobhan Atkinson   Hospitalist   
Siobhan Atkinson   Hospitalist   
Addendum : Vanco was changed to Daptomycin as per ID .  Pt will be d/c today   d/c time 40 mns       [facility]   
Steven Campos  Attending Physician   Division of Infectious Disease  Pager #[phone]  After 5pm/weekend or no response, call #[phone]
Steven Campos  Attending Physician   Division of Infectious Disease  Pager #[phone]  After 5pm/weekend or no response, call #[phone]
Steven Campos  Attending Physician   Division of Infectious Disease  Pager #[phone]  After 5pm/weekend or no response, call #[phone]
Steven Campos  Attending Physician   Division of Infectious Disease  Pager #[phone]  After 5pm/weekend or no response, call #[phone]    Please call the ID service [phone] with questions or concerns over the weekend.

## 2018-10-31 NOTE — PROGRESS NOTE ADULT - NEGATIVE NEUROLOGICAL SYMPTOMS
no weakness/no headache/no confusion
no confusion/no headache/no weakness
no headache/no confusion/no weakness
no confusion/no weakness/no headache

## 2018-10-31 NOTE — PROGRESS NOTE ADULT - NEGATIVE GENERAL GENITOURINARY SYMPTOMS
no dysuria/normal urinary frequency/no hematuria
no dysuria/normal urinary frequency/no hematuria
normal urinary frequency/no dysuria/no hematuria
no hematuria/no dysuria/normal urinary frequency

## 2018-10-31 NOTE — PROGRESS NOTE ADULT - PROBLEM SELECTOR PLAN 5
-Chronic  -C/w losartan 50mg
DVT ppx: lovenox subq
-Chronic  -C/w losartan 50mg
-due to above

## 2018-10-31 NOTE — PROGRESS NOTE ADULT - NEGATIVE GASTROINTESTINAL SYMPTOMS
no abdominal pain/no vomiting/no nausea/no diarrhea
no vomiting/no diarrhea/no nausea/no abdominal pain
no vomiting/no diarrhea/no abdominal pain/no nausea
no vomiting/no diarrhea/no abdominal pain/no nausea

## 2018-10-31 NOTE — PROGRESS NOTE ADULT - PROVIDER SPECIALTY LIST ADULT
Hospitalist
Internal Medicine
Internal Medicine
Neurosurgery
Radiology
Infectious Disease
Neurosurgery
Infectious Disease

## 2018-10-31 NOTE — PROGRESS NOTE ADULT - PROBLEM SELECTOR PLAN 7
Pt hem = 10.9 today as per patient in 9/2018 she was told to have low H/H but has no bleed .  Pt also state to have her Menses and has multiple blood draw / as discussed with ptient , she will have the CBC repeated on 11/5/18 or 11/6/18 with her PMD Dr Trotter .
-weekly cbc, cmp, esr, crp while on abx - fax [phone]
-weekly cbc, cmp, esr, crp, CPK while on abx - fax [phone]    D/w Carmen (pharmacist) from [facility] - regarding pt care plan
-weekly cbc, cmp, esr, crp while on abx - fax [phone]
-weekly cbc, cmp, esr, crp while on abx

## 2018-10-31 NOTE — PROGRESS NOTE ADULT - PROBLEM SELECTOR PROBLEM 2
Psoas abscess
Ulcerative colitis
Psoas abscess

## 2018-10-31 NOTE — PROGRESS NOTE ADULT - ASSESSMENT
48F here with back pain and abn finidngs on PET and MRI with L1/2 uptake concerning for OM vs MM    Plan:  - no acute neurosurgical intervention at this time  - Medicine eval for OM vs MM  - MRI w/wo contrast  - Blood cx x2, esr, crp, CBC, BMP, SPEP, UPEP, M protein, free light chains  - pain control
47 yo F with PMH HTN, UC (on Remicaide), depression sent in from neurosurgeon's office (Dr. Usman Craig) for further evaluation of spinal lesion on PET scan, findings concerning for osteomyelitis vs. MM with MRI Spine findings consistent with L1-L2 Osteomyelitis/discitis/phlegmon
47 yo F with PMH HTN, UC (on Remicaide), depression sent in from neurosurgeon's office (Dr. Usman Craig) for further evaluation of spinal lesion on PET scan, findings concerning for osteomyelitis vs. MM with MRI Spine findings consistent with L1-L2 Osteomyelitis/discitis/phlegmon.  Patient is s/p CT guided Biopsy done on 10/29/18.
48 yr old F here with back pain and abn finidngs on PET and MRI with L1/2 uptake concerning for OM vs MM    Plan:  - no acute neurosurgical intervention at this time  - Continue management per primary team  - pain control  - Recommend CT guided biopsy with Anesthesia on 10/29/18. Hold off on Abx at present.
49 yo F with PMH HTN, UC (on Remicaide), depression sent in from neurosurgeon's office (Dr. Usman Craig) for further evaluation of spinal lesion on PET scan, findings concerning for osteomyelitis vs. MM with MRI Spine findings consistent with L1-L2 Osteomyelitis/discitis/phlegmon
49 yo F with PMH HTN, UC (on Remicaide), depression sent in from neurosurgeon's office (Dr. Usman Craig) for further evaluation of spinal lesion on PET scan, findings concerning for osteomyelitis vs. MM with MRI Spine findings consistent with L1-L2 Osteomyelitis/discitis/phlegmon.  Patient is s/p CT guided Biopsy done on 10/29/18.
49 yo F with PMH HTN, UC (on Remicaide), depression sent in from neurosurgeon's office (Dr. Usman Craig) for further evaluation of spinal lesion on PET scan, findings concerning for osteomyelitis vs. MM.
49 yo F with PMH HTN, UC (on Remicaide), depression sent in from neurosurgeon's office (Dr. Usman Craig) for further evaluation of spinal lesion on PET scan, findings concerning for osteomyelitis vs. MM. MRI Spine findings consistent with Osteomyelitis
49 yo F with PMH HTN, UC (on Remicaide), depression sent in from neurosurgeon's office (Dr. Usman Craig) for further evaluation of spinal lesion on PET scan, findings concerning for osteomyelitis vs. MM with MRI Spine findings consistent with L1-L2 Osteomyelitis/discitis/phlegmon
47 yo F with PMH HTN, UC (on Remicade), depression sent in from neurosurgeon's office (Dr. Usman Craig) for further evaluation of PET scan findings concerning for spinal osteomyelitis, psoas abscess, back pain with elevated ESR, CRP
47 yo F with PMH HTN, UC (on Remicade), depression sent in from neurosurgeon's office (Dr. Usman Craig) for further evaluation of PET scan findings concerning for spinal osteomyelitis, psoas abscess, back pain with elevated ESR, CRP
49 yo F with PMH HTN, UC (on Remicade), depression sent in from neurosurgeon's office (Dr. Usman Craig) for further evaluation of PET scan findings concerning for spinal osteomyelitis, psoas abscess, back pain with elevated ESR, CRP
47 yo F with PMH HTN, UC (on Remicade), depression sent in from neurosurgeon's office (Dr. Usman Craig) for further evaluation of PET scan findings concerning for spinal osteomyelitis, psoas abscess, back pain with elevated ESR, CRP

## 2018-10-31 NOTE — PROGRESS NOTE ADULT - SUBJECTIVE AND OBJECTIVE BOX
Patient is a 48y old  Female who presents with a chief complaint of Osteomyelitis vs MM (30 Oct 2018 18:31)      INTERVAL HPI/OVERNIGHT EVENTS:    Patient is placed currently on Vancomycin / Ceftriaxone       Medications:MEDICATIONS  (STANDING):  cefTRIAXone   IVPB 2 Gram(s) IV Intermittent every 24 hours  docosanol 10% Cream 1 Application(s) Topical five times a day  enoxaparin Injectable 40 milliGRAM(s) SubCutaneous daily  influenza   Vaccine 0.5 milliLiter(s) IntraMuscular once  Levomilnacipran (Fetzima) 40 milliGRAM(s) 40 milliGRAM(s) Oral daily  losartan 50 milliGRAM(s) Oral daily  vancomycin  IVPB 1000 milliGRAM(s) IV Intermittent every 12 hours    MEDICATIONS  (PRN):      Allergies: Allergies    No Known Allergies    Intolerances        REVIEW OF SYSTEMS:  CONSTITUTIONAL: No fever  NECK: No pain or stiffness  RESPIRATORY: No cough, wheezing, chills or hemoptysis; No shortness of breath  CARDIOVASCULAR: No chest pain, palpitations, dizziness, or leg swelling  GASTROINTESTINAL: No abdominal or epigastric pain. No nausea, vomiting, or hematemesis; No diarrhea or constipation. No melena or hematochezia.  GENITOURINARY: No dysuria  NEUROLOGICAL: No headaches  SKIN: No itching, burning, rashes, or lesions   PSYCHIATRIC: No depression    T(C): 36.7 (10-31-18 @ 04:49), Max: 36.7 (10-31-18 @ 04:49)  HR: 78 (10-31-18 @ 04:49) (78 - 85)  BP: 107/72 (10-31-18 @ 04:49) (98/60 - 115/76)  RR: 18 (10-31-18 @ 04:49) (18 - 18)  SpO2: 98% (10-31-18 @ 04:49) (98% - 100%)  Wt(kg): --Vital Signs Last 24 Hrs  T(C): 36.7 (31 Oct 2018 04:49), Max: 36.7 (31 Oct 2018 04:49)  T(F): 98 (31 Oct 2018 04:49), Max: 98 (31 Oct 2018 04:49)  HR: 78 (31 Oct 2018 04:49) (78 - 85)  BP: 107/72 (31 Oct 2018 04:49) (98/60 - 115/76)  BP(mean): --  RR: 18 (31 Oct 2018 04:49) (18 - 18)  SpO2: 98% (31 Oct 2018 04:49) (98% - 100%)  I&O's Summary    30 Oct 2018 07:01  -  31 Oct 2018 07:00  --------------------------------------------------------  IN: 1500 mL / OUT: 0 mL / NET: 1500 mL        PHYSICAL EXAM:  GENERAL: NAD, well-groomed, well-developed  HEAD:  Atraumatic, Normocephalic  NECK: Supple, No JVD, Normal thyroid  NERVOUS SYSTEM:  Alert & Oriented X3, Good concentration  CHEST/LUNG: Clear to percussion bilaterally; No rales, rhonchi, wheezing, or rubs  HEART: Regular rate and rhythm; No murmurs, rubs, or gallops  ABDOMEN: Soft, Nontender, Nondistended; Bowel sounds present  EXTREMITIES:  2+ Peripheral Pulses, No clubbing, cyanosis, or edema  Spine : with no erythema noted / bandage on the back is noted     Consultant(s) Notes Reviewed:  [x ] YES  [ ] NO  Care Discussed with Consultants/Other Providers [ x] YES  [ ] NO  Name of Consultant  LABS:                        10.9   6.07  )-----------( 308      ( 31 Oct 2018 07:56 )             33.1     10-31    139  |  104  |  10  ----------------------------<  95  3.9   |  25  |  0.54    Ca    9.3      31 Oct 2018 05:51          CAPILLARY BLOOD GLUCOSE                RADIOLOGY & ADDITIONAL TESTS:  EKG :     Imaging Personally Reviewed:  [ ] YES  [ ] NO  HEALTH ISSUES - PROBLEM Dx:  Medication monitoring encounter: Medication monitoring encounter  Long term (current) use of antibiotics: Long term (current) use of antibiotics  Other acute osteomyelitis, other site: Other acute osteomyelitis, other site  Elevated C-reactive protein: Elevated C-reactive protein  Elevated erythrocyte sedimentation rate: Elevated erythrocyte sedimentation rate  Back pain: Back pain  Psoas abscess: Psoas abscess  Vertebral osteomyelitis: Vertebral osteomyelitis  Vertebral anomaly: Vertebral anomaly  Depression: Depression  Prophylactic measure: Prophylactic measure  HTN (hypertension): HTN (hypertension)  Ulcerative colitis: Ulcerative colitis

## 2018-10-31 NOTE — PROGRESS NOTE ADULT - NEGATIVE ENMT SYMPTOMS
no ear pain/no throat pain/no nasal discharge
no throat pain/no nasal discharge/no ear pain
no ear pain/no nasal discharge/no throat pain
no nasal discharge/no throat pain/no ear pain

## 2018-10-31 NOTE — PROGRESS NOTE ADULT - PROBLEM SELECTOR PLAN 1
-cultures negative so far  -will need a PICC + long term IV abx next week for treatment  -s/p biopsy - f/u cx   -PICC  -f/u in ID office in 6-7 weeks for followup [phone]

## 2018-10-31 NOTE — PROGRESS NOTE ADULT - NEGATIVE MUSCULOSKELETAL SYMPTOMS
no joint swelling/no neck pain
no neck pain/no joint swelling
no joint swelling/no neck pain
no neck pain/no joint swelling

## 2018-10-31 NOTE — PROGRESS NOTE ADULT - PROBLEM SELECTOR PLAN 1
-MRI spine reveals findings consistent with progressive discitis/osteomyelitis at L1-L2, with prevertebral phlegmon and probable microabscesses in the psoas   muscles  -could be from recent Trigger point injection  -No indication of sepsis at this time  -Blood cx NGTD  -S/P  guided biopsy L1-2 done  10/29/18 with no bacteria on gram stain / pos PMNS   -Neurosurgery on board  - F/UP  fluid cx for bacterial, fungal , AFB + path form Biopsy specimen   -C/W  vancomycin 1 gm iv q12 + Ceftriaxone 2 gm iv q24  ( start 10/29/18)   -PICC on 10/30   - F/UP Vancomycin through pre 4th dose   - will need to monitor renal function as well  Once arrangement for outpatient IV ABX is done , patient will be d/c home and will schedule f/up visit with ID in the clinic and CX will be f/up and antibiotics will be adjusted then. -MRI spine reveals findings consistent with progressive discitis/osteomyelitis at L1-L2, with prevertebral phlegmon and probable microabscesses in the psoas   muscles  -could be from recent Trigger point injection  -No indication of sepsis at this time  -Blood cx NGTD  -S/P  guided biopsy L1-2 done  10/29/18 with no bacteria on gram stain / pos PMNS   -Neurosurgery on board  - F/UP  fluid cx for bacterial, fungal , AFB + path form Biopsy specimen   -C/W  vancomycin 1 gm iv q12 + Ceftriaxone 2 gm iv q24  ( start 10/29/18)   -PICC on 10/30   -  Low vanco througth dose before 3rd dose instead of pre 4th dose   - will need to monitor renal function as well   outpatient IV ABX arrangement is done , patient will be d/c home today and will schedule f/up visit with ID in the clinic and CX will be f/up and antibiotics will be adjusted then.

## 2018-10-31 NOTE — PROGRESS NOTE ADULT - NSHPATTENDINGPLANDISCUSS_GEN_ALL_CORE
Pt
JAMIE Anguiano
JAMIE marques
NP and ID / Dr Muniz
JAMIE Ritter
ID / Dr Campos , NP Lianet
pt + 
pt + Dr. Atkinson
pt + Dr. Atkinson
pt +

## 2018-11-12 LAB
CULTURE RESULTS: SIGNIFICANT CHANGE UP
SPECIMEN SOURCE: SIGNIFICANT CHANGE UP

## 2018-11-16 PROBLEM — I10 ESSENTIAL (PRIMARY) HYPERTENSION: Chronic | Status: ACTIVE | Noted: 2018-10-24

## 2018-11-16 PROBLEM — K51.90 ULCERATIVE COLITIS, UNSPECIFIED, WITHOUT COMPLICATIONS: Chronic | Status: ACTIVE | Noted: 2018-10-24

## 2018-11-16 PROBLEM — F32.9 MAJOR DEPRESSIVE DISORDER, SINGLE EPISODE, UNSPECIFIED: Chronic | Status: ACTIVE | Noted: 2018-10-24

## 2018-11-28 LAB
CULTURE RESULTS: SIGNIFICANT CHANGE UP
SPECIMEN SOURCE: SIGNIFICANT CHANGE UP

## 2018-12-13 ENCOUNTER — APPOINTMENT (OUTPATIENT)
Dept: INFECTIOUS DISEASE | Facility: CLINIC | Age: 48
End: 2018-12-13
Payer: COMMERCIAL

## 2018-12-13 VITALS
HEART RATE: 79 BPM | DIASTOLIC BLOOD PRESSURE: 78 MMHG | BODY MASS INDEX: 23.39 KG/M2 | TEMPERATURE: 97.2 F | RESPIRATION RATE: 18 BRPM | HEIGHT: 63 IN | OXYGEN SATURATION: 100 % | WEIGHT: 132 LBS | SYSTOLIC BLOOD PRESSURE: 125 MMHG

## 2018-12-13 DIAGNOSIS — M46.20 OSTEOMYELITIS OF VERTEBRA, SITE UNSPECIFIED: ICD-10-CM

## 2018-12-13 DIAGNOSIS — Z45.2 ENCOUNTER FOR ADJUSTMENT AND MANAGEMENT OF VASCULAR ACCESS DEVICE: ICD-10-CM

## 2018-12-13 DIAGNOSIS — Z09 ENCOUNTER FOR FOLLOW-UP EXAMINATION AFTER COMPLETED TREATMENT FOR CONDITIONS OTHER THAN MALIGNANT NEOPLASM: ICD-10-CM

## 2018-12-13 DIAGNOSIS — Z79.2 LONG TERM (CURRENT) USE OF ANTIBIOTICS: ICD-10-CM

## 2018-12-13 PROCEDURE — 99213 OFFICE O/P EST LOW 20 MIN: CPT

## 2018-12-13 RX ORDER — CEFTRIAXONE 1 G/1
1 INJECTION, POWDER, FOR SOLUTION INTRAMUSCULAR; INTRAVENOUS
Refills: 0 | Status: ACTIVE | COMMUNITY
Start: 2018-12-13

## 2018-12-19 LAB
CULTURE RESULTS: SIGNIFICANT CHANGE UP
SPECIMEN SOURCE: SIGNIFICANT CHANGE UP

## 2019-04-03 NOTE — ED ADULT TRIAGE NOTE - BP NONINVASIVE DIASTOLIC (MM HG)
Pt here for office visit. Pt requested refill on MSIR and MS Contin. Per ginger pt due for refill on 04/14/2019.  Pt may fill on 04/13/2019 due to pharmacy being closed on Sundays
90

## 2020-01-14 NOTE — ED PROVIDER NOTE - MUSCULOSKELETAL, MLM
Spine appears normal, range of motion is not limited, no muscle or joint tenderness no fever and no chills.

## 2020-03-25 NOTE — PATIENT PROFILE ADULT - DO YOU FEEL UNSAFE AT SCHOOL?
----- Message from Gary Rae sent at 3/25/2020  8:52 AM CDT -----  Contact: Pt  Patient called requesting to have an order for more ostomy, bags states she's on her last one requesting callback to discuss this matter     [phone] (home)     
Called her and told her I sent a script to N today  And she could expect that new DME will be sending her supplies as [facility] no longer uses [employer].  She is just home from [facility] as she had pneumonia.   
no

## 2020-04-02 PROBLEM — Z79.2 LONG TERM CURRENT USE OF ANTIBIOTICS: Status: ACTIVE | Noted: 2018-12-13

## 2020-11-10 NOTE — PATIENT PROFILE ADULT - LAST BOWEL MOVEMENT DATE
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 This can be removed with a scrape and burn technique called ED&C, which does not require stitches. Please schedule for half hour procedure. The two lesions on his scalp are benign wisdom spots called seborrheic keratoses. Nothing further needs to be done for those. 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 This occurs when due to pressure, there is degeneration of the cartilage and it can be very painful. If it continues to be painful after the site heals, I recommend we refer to ENT to consider more aggressive surgery to remove the painful cartilage. Until then, try to reduce pressure on the area. A CNH pillow (available online) may be helpful.
Referral faxed
25-Oct-2018

## 2021-04-22 ENCOUNTER — TRANSCRIPTION ENCOUNTER (OUTPATIENT)
Age: 51
End: 2021-04-22

## 2021-07-21 ENCOUNTER — TRANSCRIPTION ENCOUNTER (OUTPATIENT)
Age: 51
End: 2021-07-21

## 2022-03-02 ENCOUNTER — TRANSCRIPTION ENCOUNTER (OUTPATIENT)
Age: 52
End: 2022-03-02

## 2023-08-14 ENCOUNTER — NON-APPOINTMENT (OUTPATIENT)
Age: 53
End: 2023-08-14

## 2025-01-12 ENCOUNTER — NON-APPOINTMENT (OUTPATIENT)
Age: 55
End: 2025-01-12